# Patient Record
Sex: FEMALE | Race: BLACK OR AFRICAN AMERICAN | Employment: STUDENT | URBAN - METROPOLITAN AREA
[De-identification: names, ages, dates, MRNs, and addresses within clinical notes are randomized per-mention and may not be internally consistent; named-entity substitution may affect disease eponyms.]

---
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
## 2020-04-02 ENCOUNTER — TELEPHONE (OUTPATIENT)
Dept: FAMILY MEDICINE CLINIC | Age: 20
End: 2020-04-02

## 2020-04-02 NOTE — TELEPHONE ENCOUNTER
----- Message from Jb Fink sent at 4/2/2020  7:56 AM EDT -----  Regarding: Do.Lobb/Telephone  Level 1/Escalated Issue      Caller's first and last name and relationship (if not the patient): Rocio Rice , Mom       Best contact number(s): [phone]      What are the symptoms: Mental state ( stress and depression) scared her mother is going to past away due to her sickness       Transfer successful - yes/no (include outcome): Attempt to transfer/ no answer       Transfer declined - yes/no (include reason):n/a      Was caller advised to seek appropriate level of care - yes/no:Yes       Details to clarify the request:Margy requesting a refill for \"Zotec (unsure of mg)\" and anxiety Rx (unsure of name and mg)\" to be call into [address] [place] [address] on file. Margy stated the pt have allergies symptoms due to guidelines was not able to schedule an appointment.           Michelle oRdriguez

## 2020-04-02 NOTE — TELEPHONE ENCOUNTER
Called and spoke with mom. Advised that since it has been a year since she has been in office she would need to do a virtual visit. Mom placed patient on the phone. Spoke with patient and sent her a text link to get set up for [facility]. Patient will call back to schedule a virtual visit once she is set up in [facility].

## 2020-05-04 DIAGNOSIS — G47.00 INSOMNIA, UNSPECIFIED TYPE: ICD-10-CM

## 2020-05-05 RX ORDER — ESCITALOPRAM OXALATE 10 MG/1
TABLET ORAL
Qty: 30 TAB | Refills: 0 | Status: SHIPPED | OUTPATIENT
Start: 2020-05-05 | End: 2020-10-05 | Stop reason: SDUPTHER

## 2020-09-04 ENCOUNTER — TELEPHONE (OUTPATIENT)
Dept: FAMILY MEDICINE CLINIC | Age: 20
End: 2020-09-04

## 2020-10-05 ENCOUNTER — VIRTUAL VISIT (OUTPATIENT)
Dept: FAMILY MEDICINE CLINIC | Age: 20
End: 2020-10-05
Payer: COMMERCIAL

## 2020-10-05 DIAGNOSIS — G47.00 INSOMNIA, UNSPECIFIED TYPE: ICD-10-CM

## 2020-10-05 DIAGNOSIS — F32.A ANXIETY AND DEPRESSION: Primary | ICD-10-CM

## 2020-10-05 DIAGNOSIS — F41.9 ANXIETY AND DEPRESSION: Primary | ICD-10-CM

## 2020-10-05 PROCEDURE — 99213 OFFICE O/P EST LOW 20 MIN: CPT | Performed by: NURSE PRACTITIONER

## 2020-10-05 RX ORDER — ESCITALOPRAM OXALATE 10 MG/1
TABLET ORAL
Qty: 30 TAB | Refills: 5 | Status: SHIPPED | OUTPATIENT
Start: 2020-10-05 | End: 2021-10-05

## 2020-10-05 NOTE — PROGRESS NOTES
Consent: Ammon Howell, who was seen by synchronous (real-time) audio-video technology, and/or her healthcare decision maker, is aware that this patient-initiated, Telehealth encounter on 10/5/2020 is a billable service, with coverage as determined by her insurance carrier. She is aware that she may receive a bill and has provided verbal consent to proceed: Yes. 712      Subjective:   Ammon Howell is a 21 y.o. female who was seen for Medication Refill  States she used to have a medication for depression/anxiety that she wants to get back on - Lexapro 10mg per chart review   Has been off it since January d/t lack of insurance  Pt states she is now on [place] and would like to resume Lexapro  Reports that she struggles with \"tension in my body\" when off the medication and it was helpful in achieving restful sleep, was taking in the evening time. When on medication pt states it made her feel \"more peaceful. \" Denies negative SE/ADRs. Would like flu shot, advised will have to go to [facility]/t insurance. Prior to Admission medications    Medication Sig Start Date End Date Taking? Authorizing Provider   escitalopram oxalate (LEXAPRO) 10 mg tablet TAKE 1 TABLET BY MOUTH DAILY 10/5/20  Yes Tom NARANJO NP   ibuprofen (MOTRIN IB) 200 mg tablet Take  by mouth. Yes Provider, Historical   escitalopram oxalate (LEXAPRO) 10 mg tablet TAKE 1 TABLET BY MOUTH DAILY 5/5/20 10/5/20  Mirtha De Leon,    norgestimate-ethinyl estradiol (ORTHO TRI-CYCLEN, TRI-SPRINTEC) 0.18/0.215/0.25 mg-35 mcg (28) tab Take 1 Tab by mouth daily. May skip sugar pills 4/5/19   Bartolome Mariscal NP   loratadine (CLARITIN REDITABS) 10 mg dissolvable tablet Take 1 Tab by mouth daily.  11/1/18   Bartolome Mariscal NP     No Known Allergies    Patient Active Problem List    Diagnosis Date Noted    Dysmenorrhea in adolescent 07/13/2017    Emotional disturbance of adolescence 03/13/2014    Eczema 10/18/2011       ROS - negative except as listed above in the HPI      Objective:   Vital Signs: (As obtained by patient/caregiver at home)  There were no vitals taken for this visit. Physical Exam:   General: alert, cooperative, no distress   Mental  status: normal mood, behavior, speech, dress, motor activity, and thought processes, able to follow commands   HEENT: normocephalic, atraumatic    Eyes:  extraocular movements intact, sclera normal, no visible discharge   Ears:  external ears normal   Mouth/Throat:  mucous memrates appear moist    Neck: no visualized mass   Resp: respiratory effort is normal, breathing appears non-labored, no visualized signs of respiratory distress   Neuro: no gross deficits   Skin: no discoloration or lesions of concern on visible areas   Psychiatric: normal affect, consistent with stated mood, no evidence of hallucinations      Additional exam findings:      Assessment & Plan:   Diagnoses and all orders for this visit:    1. Anxiety and depression  2. Insomnia, unspecified type  -     escitalopram oxalate (LEXAPRO) 10 mg tablet; TAKE 1 TABLET BY MOUTH DAILY  - Restart med, tolerated well in the past.          Follow-up and Dispositions    · Return if symptoms worsen or fail to improve. I spent at least 15 minutes with this established patient, and >50% of the time was spent counseling and/or coordinating care regarding anxiety/depression      We discussed the expected course, resolution and complications of the diagnosis(es) in detail. Medication risks, benefits, costs, interactions, and alternatives were discussed as indicated. I advised her to contact the office if her condition worsens, changes or fails to improve as anticipated. She expressed understanding with the diagnosis(es) and plan. Deborah Chilel is a 21 y.o. female being evaluated by a video visit encounter for concerns as above. A caregiver was present when appropriate.  Due to this being a TeleHealth encounter (During IEQ-31 public health emergency), evaluation of the following organ systems was limited: Vitals/Constitutional/EENT/Resp/CV/GI//MS/Neuro/Skin/Heme-Lymph-Imm. Pursuant to the emergency declaration under the [address] [place] [address], [facility] waiver authority and the [employer] and [employer] Act, this Virtual  Visit was conducted, with patient's (and/or legal guardian's) consent, to reduce the patient's risk of exposure to COVID-19 and provide necessary medical care. Services were provided through a video synchronous discussion virtually to substitute for in-person clinic visit. Patient and provider were located at their individual homes.         Ulises Skinner NP

## 2020-10-05 NOTE — PROGRESS NOTES
Patricia Hyman is a 21 y.o. female , id x 2(name and ). Reviewed record, history, and  medications. Chief Complaint   Patient presents with    Medication Refill       3 most recent PHQ Screens 10/5/2020   Little interest or pleasure in doing things Not at all   Feeling down, depressed, irritable, or hopeless Not at all   Total Score PHQ 2 0   In the past year have you felt depressed or sad most days, even if you felt okay? -   Has there been a time in the past month when you have had serious thoughts about ending your life?  -   Have you ever in your whole life, tried to kill yourself or made a suicide attempt? -

## 2021-02-15 ENCOUNTER — OFFICE VISIT (OUTPATIENT)
Dept: OBGYN CLINIC | Age: 21
End: 2021-02-15
Payer: MEDICAID

## 2021-02-15 VITALS
HEIGHT: 69 IN | WEIGHT: 195.6 LBS | BODY MASS INDEX: 28.97 KG/M2 | SYSTOLIC BLOOD PRESSURE: 116 MMHG | DIASTOLIC BLOOD PRESSURE: 64 MMHG

## 2021-02-15 DIAGNOSIS — Z34.00 ENCOUNTER FOR SUPERVISION OF NORMAL INTRAUTERINE PREGNANCY IN PRIMIGRAVIDA, ANTEPARTUM: Primary | ICD-10-CM

## 2021-02-15 DIAGNOSIS — Z00.00 WELL WOMAN EXAM (NO GYNECOLOGICAL EXAM): ICD-10-CM

## 2021-02-15 PROCEDURE — 99395 PREV VISIT EST AGE 18-39: CPT | Performed by: OBSTETRICS & GYNECOLOGY

## 2021-02-15 RX ORDER — DIPHENHYDRAMINE HCL 25 MG
25 CAPSULE ORAL
COMMUNITY

## 2021-02-15 NOTE — PROGRESS NOTES
Esther Stevens is a No obstetric history on file. ,  21 y.o. female [address] whose Patient's last menstrual period was 02/08/2021 (within days). was on 2/8/2021 who presents for her annual checkup. She is having Pt reports her [place] [address] balance is \"off\" after her period. She would like to know some ways to regulate it. She would also like to know how to do a self breast exam. Pt reports having painful bumps in her vaginal area, she first noticed it a few years ago and she has experienced it twice now. Pt declines a refill for OCP. She gets an odor after her menses and is concerned about vaginal ph. She has no vaginal bumps now. Menses are now regular and not too heavy. With regard to the Gardisil vaccine, she has received all 3 injections. Menstrual status:    Her periods are moderate in flow. She is using three to five pads or tampons per day, usually regular and last 26-30 days. She denies dysmenorrhea. Mild abdominal cramps. She reports no premenstrual symptoms. Contraception:    The current method of family planning is none and She declines contraception and counseling. Not sexually active. Sexual history:    She  reports never being sexually active. Medical conditions:    She has never had an annual GYN exam, she has not the following changes in her health history: none. Pap and Mammogram History:    She has never had a Pap smear or HPV test.     The patient has never had a mammogram.    The patient does have a family history of breast cancer. States that her mother had breast cancer in her 19's. 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       escitalopram oxalate (LEXAPRO) 10 mg tablet TAKE 1 TABLET BY MOUTH DAILY 30 Tab 5    ibuprofen (MOTRIN IB) 200 mg tablet Take  by mouth.  norgestimate-ethinyl estradiol (ORTHO TRI-CYCLEN, TRI-SPRINTEC) 0.18/0.215/0.25 mg-35 mcg (28) tab Take 1 Tab by mouth daily. May skip sugar pills 1 Package 11    loratadine (CLARITIN REDITABS) 10 mg dissolvable tablet Take 1 Tab by mouth daily. 30 Tab 5     Allergies: Patient has no known allergies. Social History     Socioeconomic History    Marital status: SINGLE     Spouse name: Not on file    Number of children: Not on file    Years of education: Not on file    Highest education level: Not on file   Occupational History    Not on file   Social Needs    Financial resource strain: Not on file    Food insecurity     Worry: Not on file     Inability: Not on file    Transportation needs     Medical: Not on file     Non-medical: Not on file   Tobacco Use    Smoking status: Never Smoker    Smokeless tobacco: Never Used   Substance and Sexual Activity    Alcohol use: No    Drug use: No    Sexual activity: Never   Lifestyle    Physical activity     Days per week: Not on file     Minutes per session: Not on file    Stress: Not on file   Relationships    Social connections     Talks on phone: Not on file     Gets together: Not on file     Attends Christian service: Not on file     Active member of club or organization: Not on file     Attends meetings of clubs or organizations: Not on file     Relationship status: Not on file    Intimate partner violence     Fear of current or ex partner: Not on file     Emotionally abused: Not on file     Physically abused: Not on file     Forced sexual activity: Not on file   Other Topics Concern    Not on file   Social History Narrative    Not on file     Tobacco History:  reports that she has never smoked. She has never used smokeless tobacco.  Alcohol Abuse:  reports no history of alcohol use.   Drug Abuse:  reports no history of drug use. Patient Active Problem List   Diagnosis Code    Eczema L30.9    Emotional disturbance of adolescence F93.9    Dysmenorrhea in adolescent N94.6       Review of Systems - History obtained from the patient  Constitutional: negative for weight loss, fever, night sweats  HEENT: negative for hearing loss, earache, congestion, snoring, sorethroat  CV: negative for chest pain, palpitations, edema  Resp: negative for cough, shortness of breath, wheezing  GI: negative for change in bowel habits, abdominal pain, black or bloody stools  : negative for frequency, dysuria, hematuria, vaginal discharge  MSK: negative for back pain, joint pain, muscle pain  Breast: negative for breast lumps, nipple discharge, galactorrhea  Skin :negative for itching, rash, hives  Neuro: negative for dizziness, headache, confusion, weakness  Psych: negative for anxiety, depression, change in mood  Heme/lymph: negative for bleeding, bruising, pallor    Physical Exam    Visit Vitals  /64   Ht 5' 9\" (1.753 m)   Wt 195 lb 9.6 oz (88.7 kg)   LMP 02/08/2021 (Within Days)   BMI 28.89 kg/m²       Constitutional  · Appearance: well-nourished, well developed, alert, in no acute distress      Neurologic/Psychiatric  · Mental Status:  · Orientation: grossly oriented to person, place and time  · Mood and Affect: mood normal, affect appropriate    . Assessment:  Routine gynecologic examination  Her current medical status is satisfactory  Family h/o breast cancer  Likely episodes of BV by history. Plan:  Counseled re: diet, exercise, healthy lifestyle  Return for yearly wellness visits  Instructed in SBE and given handouts  Advised her to see her mother's breast cancer MD to discuss any increased breast cancer surveillance. Advised to RTO when she has the vaginal bumps so that they can be evaluated. She will try probiotics and yogurt for her vaginal ph symptoms. Call if she wants to try Flagy.

## 2021-02-15 NOTE — PATIENT INSTRUCTIONS

## 2021-05-27 ENCOUNTER — OFFICE VISIT (OUTPATIENT)
Dept: FAMILY MEDICINE CLINIC | Age: 21
End: 2021-05-27

## 2021-05-27 VITALS
HEART RATE: 69 BPM | BODY MASS INDEX: 30.07 KG/M2 | SYSTOLIC BLOOD PRESSURE: 96 MMHG | TEMPERATURE: 99.1 F | WEIGHT: 203 LBS | RESPIRATION RATE: 18 BRPM | DIASTOLIC BLOOD PRESSURE: 66 MMHG | HEIGHT: 69 IN | OXYGEN SATURATION: 99 %

## 2021-05-27 DIAGNOSIS — R73.01 IMPAIRED FASTING GLUCOSE: ICD-10-CM

## 2021-05-27 DIAGNOSIS — E78.2 MIXED HYPERLIPIDEMIA: Primary | ICD-10-CM

## 2021-05-27 DIAGNOSIS — L73.1 INGROWN HAIR: ICD-10-CM

## 2021-05-27 PROCEDURE — 99214 OFFICE O/P EST MOD 30 MIN: CPT | Performed by: NURSE PRACTITIONER

## 2021-05-27 NOTE — PROGRESS NOTES
Chief Complaint   Patient presents with   Nia Lloyd     Patient in office today for cpe and labs-pt last meal was at 1am.    Pt have c/o of bump noted on pubis. Pt denies drainage. Pt denies shaving or hair removal products. Pt denies increase in size  Have not treated with otc. 1. Have you been to the ER, urgent care clinic since your last visit? Hospitalized since your last visit? No    2. Have you seen or consulted any other health care providers outside of the [address] [place] [address] since your last visit? Include any pap smears or colon screening.  No

## 2021-05-27 NOTE — PROGRESS NOTES
Chief Complaint   Patient presents with   Nia Lloyd     Patient in office today for cpe and labs-pt last meal was at 1am.  Pt has medicaid, physicals not covered. Pt have c/o of bump noted on pubis. First noticed a few days. Pt denies drainage. Pt denies shaving or hair removal products. Pt denies increase in size  Have not treated with otc. Denies any hisotry of MRSA. Health Maintenance Due   Topic Date Due    Hepatitis C Screening  Never done    DTaP/Tdap/Td series (2 - Td) 09/23/2010    COVID-19 Vaccine (1) Never done     Denies any other concerns at this time. Chief Complaint   Patient presents with   Nia Lloyd     she is a 21y.o. year old female who presents for evalution. Reviewed PmHx, RxHx, FmHx, SocHx, AllgHx and updated and dated in the chart.     Review of Systems - negative except as listed above in the HPI    Objective:     Vitals:    05/27/21 0758   BP: 96/66   Pulse: 69   Resp: 18   Temp: 99.1 °F (37.3 °C)   TempSrc: Oral   SpO2: 99%   Weight: 203 lb (92.1 kg)   Height: 5' 9\" (1.753 m)     Physical Examination: General appearance - alert, well appearing, and in no distress  Mental status - normal mood, behavior, speech, dress, motor activity, and thought processes  Eyes - pupils equal and reactive, extraocular eye movements intact  Ears - bilateral TM's and external ear canals normal  Nose - normal and patent, no erythema, discharge or polyps and normal nontender sinuses  Mouth - mucous membranes moist, pharynx normal without lesions  Neck - supple, no significant adenopathy, carotids upstroke normal bilaterally, no bruits, thyroid exam: thyroid is normal in size without nodules or tenderness  Chest - clear to auscultation, no wheezes, rales or rhonchi, symmetric air entry  Heart - normal rate, regular rhythm, normal S1, S2, no murmurs  Extremities - peripheral pulses normal, no pedal edema, no clubbing or cyanosis  Skin - papule present mons pubis that appears consistent with ingrown hair without any evidence of infection    Assessment/ Plan:   Diagnoses and all orders for this visit:    1. Mixed hyperlipidemia  -     LIPID PANEL; Future  -     METABOLIC PANEL, COMPREHENSIVE; Future  -     CBC WITH AUTOMATED DIFF; Future  Will notify results and deviate plan based on findings. The patient is asked to modify diet and lifestyle to facilitate weight loss and therefore avoid health risks that are associated with obesity. 2. Impaired fasting glucose  -     TSH 3RD GENERATION; Future  -     HEMOGLOBIN A1C WITH EAG; Future  Will notify results and deviate plan based on findings. 3. Ingrown hair  Apply abx ointment PRN to affected area and keep clean with soap and water. Monitor for s/sx of infection, reviewed those sx with patient. I have discussed the diagnosis with the patient and the intended plan as seen in the above orders. The patient has received an after-visit summary and questions were answered concerning future plans. Medication Side Effects and Warnings were discussed with patient: yes  Patient Labs were reviewed and or requested: yes  Patient Past Records were reviewed and or requested  yes  Patient / Caregiver Understanding of treatment plan was verbalized during office visit YES    MARCELO Bee    There are no Patient Instructions on file for this visit.

## 2021-05-28 PROBLEM — R73.03 PREDIABETES: Status: ACTIVE | Noted: 2021-05-28

## 2021-05-28 LAB
ALBUMIN SERPL-MCNC: 4.1 G/DL (ref 3.9–5)
ALBUMIN/GLOB SERPL: 1.4 {RATIO} (ref 1.2–2.2)
ALP SERPL-CCNC: 87 IU/L (ref 45–106)
ALT SERPL-CCNC: 7 IU/L (ref 0–32)
AST SERPL-CCNC: 11 IU/L (ref 0–40)
BASOPHILS # BLD AUTO: 0 X10E3/UL (ref 0–0.2)
BASOPHILS NFR BLD AUTO: 0 %
BILIRUB SERPL-MCNC: <0.2 MG/DL (ref 0–1.2)
BUN SERPL-MCNC: 7 MG/DL (ref 6–20)
BUN/CREAT SERPL: 10 (ref 9–23)
CALCIUM SERPL-MCNC: 9.2 MG/DL (ref 8.7–10.2)
CHLORIDE SERPL-SCNC: 105 MMOL/L (ref 96–106)
CHOLEST SERPL-MCNC: 153 MG/DL (ref 100–199)
CO2 SERPL-SCNC: 24 MMOL/L (ref 20–29)
CREAT SERPL-MCNC: 0.67 MG/DL (ref 0.57–1)
EOSINOPHIL # BLD AUTO: 0.2 X10E3/UL (ref 0–0.4)
EOSINOPHIL NFR BLD AUTO: 2 %
ERYTHROCYTE [DISTWIDTH] IN BLOOD BY AUTOMATED COUNT: 13.1 % (ref 11.7–15.4)
EST. AVERAGE GLUCOSE BLD GHB EST-MCNC: 123 MG/DL
GLOBULIN SER CALC-MCNC: 3 G/DL (ref 1.5–4.5)
GLUCOSE SERPL-MCNC: 84 MG/DL (ref 65–99)
HBA1C MFR BLD: 5.9 % (ref 4.8–5.6)
HCT VFR BLD AUTO: 34.5 % (ref 34–46.6)
HDLC SERPL-MCNC: 45 MG/DL
HGB BLD-MCNC: 11.6 G/DL (ref 11.1–15.9)
IMM GRANULOCYTES # BLD AUTO: 0 X10E3/UL (ref 0–0.1)
IMM GRANULOCYTES NFR BLD AUTO: 0 %
IMP & REVIEW OF LAB RESULTS: NORMAL
LDLC SERPL CALC-MCNC: 98 MG/DL (ref 0–99)
LYMPHOCYTES # BLD AUTO: 2.8 X10E3/UL (ref 0.7–3.1)
LYMPHOCYTES NFR BLD AUTO: 36 %
MCH RBC QN AUTO: 28.8 PG (ref 26.6–33)
MCHC RBC AUTO-ENTMCNC: 33.6 G/DL (ref 31.5–35.7)
MCV RBC AUTO: 86 FL (ref 79–97)
MONOCYTES # BLD AUTO: 0.7 X10E3/UL (ref 0.1–0.9)
MONOCYTES NFR BLD AUTO: 9 %
NEUTROPHILS # BLD AUTO: 4 X10E3/UL (ref 1.4–7)
NEUTROPHILS NFR BLD AUTO: 53 %
PLATELET # BLD AUTO: 326 X10E3/UL (ref 150–450)
POTASSIUM SERPL-SCNC: 4.4 MMOL/L (ref 3.5–5.2)
PROT SERPL-MCNC: 7.1 G/DL (ref 6–8.5)
RBC # BLD AUTO: 4.03 X10E6/UL (ref 3.77–5.28)
SODIUM SERPL-SCNC: 139 MMOL/L (ref 134–144)
TRIGL SERPL-MCNC: 46 MG/DL (ref 0–149)
TSH SERPL DL<=0.005 MIU/L-ACNC: 2.52 UIU/ML (ref 0.45–4.5)
VLDLC SERPL CALC-MCNC: 10 MG/DL (ref 5–40)
WBC # BLD AUTO: 7.6 X10E3/UL (ref 3.4–10.8)

## 2021-05-28 NOTE — PROGRESS NOTES
The following message was sent to pt via [employer] portal in reference to lab results:  Good morning Fleet Ards,     Attached are the results of your most recent lab work. I have the following recommendations:    1. Your CBC which looks at your white blood cells, red blood cells, and hemoglobin came back looking normal. No sign of infection or anemia. 2. Your metabolic panel which looks at your blood glucose, liver function, and kidney function looks perfect. 3. Your cholesterol came back looking great so keep up the good work with diet and exercise. 4. Your TSH which screens for thyroid disease came back normal. This means you do not have hyper or hypothyroidism. 5. We also checked your hemoglobin a1c during your last visit. This measures your average blood glucose over the last three months. Your results suggest that you are \"prediabetic. \" This means that you have an increased risk of developing type 2 diabetes. I urge you to make some diet and lifestyle changes to prevent this from worsening, otherwise I am going to recommend you start a new daily medication. You should try to follow a low carb diet. Try to watch your portion sizes and limit your intake of sugar and carbohydrates. We want to prevent this from developing into diabetes because having diabetes increases your risk for kidney disease, stroke, heart attack, and vision loss. I recommend you follow up in 3-6 months to recheck this to make sure it has not worsened. Please let me know if you have any questions or concerns regarding these results.    MARCELO Arvizu

## 2021-10-05 ENCOUNTER — OFFICE VISIT (OUTPATIENT)
Dept: FAMILY MEDICINE CLINIC | Age: 21
End: 2021-10-05
Payer: MEDICAID

## 2021-10-05 VITALS
HEART RATE: 60 BPM | BODY MASS INDEX: 29.47 KG/M2 | WEIGHT: 199 LBS | OXYGEN SATURATION: 99 % | SYSTOLIC BLOOD PRESSURE: 103 MMHG | HEIGHT: 69 IN | TEMPERATURE: 98.4 F | DIASTOLIC BLOOD PRESSURE: 70 MMHG

## 2021-10-05 DIAGNOSIS — N92.6 IRREGULAR MENSES: Primary | ICD-10-CM

## 2021-10-05 DIAGNOSIS — R73.03 PREDIABETES: ICD-10-CM

## 2021-10-05 DIAGNOSIS — Z23 NEEDS FLU SHOT: ICD-10-CM

## 2021-10-05 PROCEDURE — 90686 IIV4 VACC NO PRSV 0.5 ML IM: CPT | Performed by: STUDENT IN AN ORGANIZED HEALTH CARE EDUCATION/TRAINING PROGRAM

## 2021-10-05 PROCEDURE — 99214 OFFICE O/P EST MOD 30 MIN: CPT | Performed by: STUDENT IN AN ORGANIZED HEALTH CARE EDUCATION/TRAINING PROGRAM

## 2021-10-05 PROCEDURE — 90471 IMMUNIZATION ADMIN: CPT | Performed by: STUDENT IN AN ORGANIZED HEALTH CARE EDUCATION/TRAINING PROGRAM

## 2021-10-05 NOTE — PROGRESS NOTES
Progress Note    she is a 24y.o. year old female who presents for evalution. Chief Complaint   Patient presents with    Complete Physical    Labs     is fasting          Assessment/ Plan:   Diagnoses and all orders for this visit:    1. Irregular menses  -     CBC WITH AUTOMATED DIFF; Future  -     TESTOSTERONE, TOTAL, FEMALE/CHILD; Future  -     TSH 3RD GENERATION; Future    2. Prediabetes  Assessment & Plan:   unclear control, continue current plan pending work up below, lifestyle modifications recommended    Orders:  -     METABOLIC PANEL, COMPREHENSIVE; Future  -     LIPID PANEL; Future  -     HEMOGLOBIN A1C WITH EAG; Future  -     CBC WITH AUTOMATED DIFF; Future  -     TESTOSTERONE, TOTAL, FEMALE/CHILD; Future  -     TSH 3RD GENERATION; Future    3. Needs flu shot  -     INFLUENZA VIRUS VAC QUAD,SPLIT,PRESV FREE SYRINGE IM    Other orders  -     CVD REPORT     Follow-up and Dispositions    · Return in about 3 months (around 1/5/2022) for follow-up prediabetes 3-6 months. I have discussed the diagnosis with the patient and the intended plan as seen in the above orders. The patient has received an after-visit summary and questions were answered concerning future plans. Pt conveyed understanding of plan. Medication Side Effects and Warnings were discussed with patient        Subjective:     Chief Complaint   Patient presents with    Complete Physical    Labs     is fasting      Diet: fast food junkie; occasional cooking her own food (once/month)   No changes to diet since 5/2021 when pre-diabetes    Drinks a lot of water, body armour (1/day). No caffeine. Alcohol 1 mixed drink of hard liquor every 2-3 days. Also wine at social events. Exercise: On her feet a lot. Works as a  and starting to do [employer] with her mom. No pets currently  Mood has been a bit of a struggle  Uses music to help.     LMP started 9/18  Some issues with facial hair, darkening of skin in axilla. Reviewed PmHx, RxHx, FmHx, SocHx, AllgHx and updated and dated in the chart. Review of Systems - negative except as listed above in the HPI    Objective:     Vitals:    10/05/21 1025   BP: 103/70   Pulse: 60   Temp: 98.4 °F (36.9 °C)   SpO2: 99%   Weight: 199 lb (90.3 kg)   Height: 5' 9\" (1.753 m)       Current Outpatient Medications   Medication Sig    diphenhydrAMINE (BenadryL) 25 mg capsule Take 25 mg by mouth every six (6) hours as needed. No current facility-administered medications for this visit. Physical Exam  Vitals and nursing note reviewed. Constitutional:       General: She is not in acute distress. Appearance: Normal appearance. She is normal weight. She is not ill-appearing, toxic-appearing or diaphoretic. HENT:      Head: Normocephalic and atraumatic. Right Ear: Tympanic membrane, ear canal and external ear normal.      Left Ear: Tympanic membrane, ear canal and external ear normal.      Nose: Nose normal.      Mouth/Throat:      Mouth: Mucous membranes are moist.      Pharynx: Oropharynx is clear. Eyes:      General: No scleral icterus. Right eye: No discharge. Left eye: No discharge. Extraocular Movements: Extraocular movements intact. Conjunctiva/sclera: Conjunctivae normal.      Pupils: Pupils are equal, round, and reactive to light. Cardiovascular:      Rate and Rhythm: Normal rate and regular rhythm. Pulses: Normal pulses. Heart sounds: Normal heart sounds. Pulmonary:      Effort: Pulmonary effort is normal. No respiratory distress. Breath sounds: Normal breath sounds. Abdominal:      General: Abdomen is flat. Bowel sounds are normal. There is no distension. Palpations: Abdomen is soft. There is no mass. Tenderness: There is no abdominal tenderness. There is no guarding or rebound. Musculoskeletal:         General: No tenderness or deformity. Cervical back: No rigidity.       Right lower leg: No edema. Left lower leg: No edema. Lymphadenopathy:      Cervical: No cervical adenopathy. Skin:     General: Skin is warm and dry. Neurological:      General: No focal deficit present. Mental Status: She is alert and oriented to person, place, and time. Sensory: No sensory deficit. Motor: No weakness. Gait: Gait normal.      Deep Tendon Reflexes: Reflexes normal.   Psychiatric:         Mood and Affect: Mood normal.         Behavior: Behavior normal.         Thought Content:  Thought content normal.         Judgment: Judgment normal.              Harrison Hrenandez MD

## 2021-10-05 NOTE — LETTER
10/18/2021    Ms. [address] [id number]      Dear Burton Conroy:    Please find your most recent results below. Resulted Orders   METABOLIC PANEL, COMPREHENSIVE   Result Value Ref Range    Glucose 86 65 - 99 mg/dL    BUN 3 (L) 6 - 20 mg/dL    Creatinine 0.65 0.57 - 1.00 mg/dL    GFR est non- >59 mL/min/1.73    GFR est  >59 mL/min/1.73    BUN/Creatinine ratio 5 (L) 9 - 23    Sodium 140 134 - 144 mmol/L    Potassium 4.6 3.5 - 5.2 mmol/L    Chloride 104 96 - 106 mmol/L    CO2 23 20 - 29 mmol/L    Calcium 9.6 8.7 - 10.2 mg/dL    Protein, total 7.2 6.0 - 8.5 g/dL    Albumin 4.3 3.9 - 5.0 g/dL    GLOBULIN, TOTAL 2.9 1.5 - 4.5 g/dL    A-G Ratio 1.5 1.2 - 2.2    Bilirubin, total <0.2 0.0 - 1.2 mg/dL    Alk. phosphatase 81 44 - 121 IU/L    AST (SGOT) 12 0 - 40 IU/L    ALT (SGPT) 9 0 - 32 IU/L    Narrative    Performed at:  [address]  [id number]  : Juan Luis Haji MD, Phone:  [phone]   HEMOGLOBIN A1C WITH EAG   Result Value Ref Range    Hemoglobin A1c 5.7 (H) 4.8 - 5.6 %    Estimated average glucose 117 mg/dL    Narrative    Performed at:  [address]  [id number]  : Juan Luis Haji MD, Phone:  [phone]       RECOMMENDATIONS:  Attached are the results of your most recent lab work. I have the following recommendations:     1. Your CBC which looks at your white blood cells, red blood cells, and hemoglobin came back looking normal. No sign of infection or anemia.      2. Your metabolic panel which looks at your blood glucose, electrolytes, liver function, and kidney function looks perfect. A1c continues to be in the prediabetic range, but has improved since 4 months ago. [address] job!     3. Your cholesterol is good. Continue with your efforts to follow a heart healthy diet and exercise routinely.  As you know the BEST way to lower cholesterol is to follow a strict diet that is low fat combined with regular exercise. Here are a few tips on how to do this:  - Avoid foods that are high in saturated and trans fats (especially fried foods)   - Replace butter with olive oil, avocado oil, other oils that are primarily high in unsaturated fats  - Eat lots of fresh fruits and vegetables  - Choose fish, chicken, and turkey as your serving of meat  - Avoid too many processed foods  - Use whole wheat bread, pasta, rice  You should also try and do 30 minutes of aerobic exercise most days of the week. All of these will contribute to lowering your cholesterol and decrease your risk of heart disease and stroke.      4. Your TSH which screens for thyroid disease came back normal. This means you likely do not have hyper or hypothyroidism.      5. Normal testosterone level.  This was checked later in the day than normally recommended, so we could repeat the test in the future if you'd like.      Some values may be minimally outside the \"normal\" range but are not harmful or clinically significant. Please let me know if you have any questions or concerns regarding these results.  I recommend we repeat fasting labs in 6 months    Please call me if you have any questions: [phone]    Sincerely,      Markel Lui MD

## 2021-10-05 NOTE — PROGRESS NOTES
Jim Spear is a 24 y.o. female , id x 2(name and ). Reviewed record, history, and  medications. Chief Complaint   Patient presents with    Complete Physical    Labs     is fasting        Vitals:    10/05/21 1025   BP: 103/70   Pulse: 60   Temp: 98.4 °F (36.9 °C)   SpO2: 99%   Weight: 199 lb (90.3 kg)   Height: 5' 9\" (1.753 m)       Coordination of Care Questionnaire:   1) Have you been to an emergency room, urgent care, or hospitalized since your last visit?   no       2. Have seen or consulted any other health care provider since your last visit? NO      3 most recent PHQ Screens 10/5/2021   Little interest or pleasure in doing things Not at all   Feeling down, depressed, irritable, or hopeless Not at all   Total Score PHQ 2 0   In the past year have you felt depressed or sad most days, even if you felt okay? -   Has there been a time in the past month when you have had serious thoughts about ending your life? -   Have you ever in your whole life, tried to kill yourself or made a suicide attempt? -       Patient is accompanied by self I have received verbal consent from Jim Spear to discuss any/all medical information while they are present in the room. After obtaining Migdalia Yuan's consent, and per orders of Dr. Durga Reyna, the vaccines ordered were given by Maria Elena Cornejo LPN. Patient instructed to remain in clinic for 20 minutes afterwards, and to report any adverse reaction to me immediately. Patient did not display any adverse side effects. Pt / caregiver given opportunity to review vaccine information sheet prior to vaccine administration. Opportunity given for questions and concerns. No questions or concerns at this time.

## 2021-10-05 NOTE — PATIENT INSTRUCTIONS
Lifestyle areas to focus on. .. Exercise  Nutrition  Sleep  Mindfulness - deep/diaphragmatic breathing, progressive muscle relaxation, guided imagery, yoga, maria guadalupe chi, meditation, prayer, Jain reading, journaling, coloring/art (practice 5 minutes, 3 times each day)  Social connectedness    Check out on amazon: WILD 5 wellness workbook    Exercise recommendations  150 minutes of moderate intensity cardio exercise in the week  3 days of total body strength training  Work on stretching/flexibility as well. It's great to get outside! But you can also check out youtube for fun videos and workouts. I like yoga with katy       Prediabetes: Care Instructions  Overview   Prediabetes is a warning sign that you're at risk for getting type 2 diabetes. It means that your blood sugar is higher than it should be. But it's not high enough to be diabetes. The food you eat naturally turns into sugar. Your body uses the sugar for energy. Normally, an organ called the pancreas makes insulin. And insulin allows the sugar in your blood to get into your body's cells. But sometimes the body can't use insulin the right way. So the sugar stays in your blood instead. This is called insulin resistance. The buildup of sugar in your blood means you have prediabetes. The good news is that you may be able to prevent or delay diabetes. Making small lifestyle changes, like getting active and changing your eating habits, may help you get your blood sugar back to normal. You can work with your doctor to make a treatment plan. Follow-up care is a key part of your treatment and safety. Be sure to make and go to all appointments, and call your doctor if you are having problems. It's also a good idea to know your test results and keep a list of the medicines you take. How can you care for yourself at home? · Watch your weight. A healthy weight helps your body use insulin properly.   · Limit the amount of calories, sweets, and unhealthy fat you eat. Ask your doctor if you should see a dietitian. A registered dietitian can help you create meal plans that fit your lifestyle. · Get at least 30 minutes of exercise on most days of the week. Exercise helps control your blood sugar. It also helps you maintain a healthy weight. Walking is a good choice. You also may want to do other activities, such as running, swimming, cycling, or playing tennis or team sports. · Do not smoke. Smoking can make prediabetes worse. If you need help quitting, talk to your doctor about stop-smoking programs and medicines. These can increase your chances of quitting for good. · If your doctor prescribed medicines, take them exactly as prescribed. Call your doctor if you think you are having a problem with your medicine. You will get more details on the specific medicines your doctor prescribes. When should you call for help? Watch closely for changes in your health, and be sure to contact your doctor if:    · You have any symptoms of diabetes. These may include:  ? Being thirsty more often. ? Urinating more. ? Being hungrier. ? Losing weight. ? Being very tired. ? Having blurry vision.     · You have a wound that will not heal.     · You have an infection that will not go away.     · You have problems with your blood pressure.     · You want more information about diabetes and how you can keep from getting it. Where can you learn more? Go to http://www.gray.com/  Enter I222 in the search box to learn more about \"Prediabetes: Care Instructions. \"  Current as of: August 31, 2020               Content Version: 13.0  © 2350-4149 Healthwise, Incorporated. Care instructions adapted under license by [employer] (which disclaims liability or warranty for this information).  If you have questions about a medical condition or this instruction, always ask your healthcare professional. [facility] disclaims any warranty or liability for your use of this information. Vaccine Information Statement    Influenza (Flu) Vaccine (Inactivated or Recombinant): What You Need to Know    Many vaccine information statements are available in Arabic and other languages. See www.immunize.org/vis. Hojas de información sobre vacunas están disponibles en español y en muchos otros idiomas. Visite www.immunize.org/vis. 1. Why get vaccinated? Influenza vaccine can prevent influenza (flu). Flu is a contagious disease that spreads around the [place] every year, usually between October and May. Anyone can get the flu, but it is more dangerous for some people. Infants and young children, people 72 years and older, pregnant people, and people with certain health conditions or a weakened immune system are at greatest risk of flu complications. Pneumonia, bronchitis, sinus infections, and ear infections are examples of flu-related complications. If you have a medical condition, such as heart disease, cancer, or diabetes, flu can make it worse. Flu can cause fever and chills, sore throat, muscle aches, fatigue, cough, headache, and runny or stuffy nose. Some people may have vomiting and diarrhea, though this is more common in children than adults. In an average year, thousands of people in the [facility] die from flu, and many more are hospitalized. Flu vaccine prevents millions of illnesses and flu-related visits to the doctor each year. 2. Influenza vaccines     CDC recommends everyone 6 months and older get vaccinated every flu season. Children 6 months through 6years of age may need 2 doses during a single flu season. Everyone else needs only 1 dose each flu season. It takes about 2 weeks for protection to develop after vaccination. There are many flu viruses, and they are always changing.  Each year a new flu vaccine is made to protect against the influenza viruses believed to be likely to cause disease in the upcoming flu season. Even when the vaccine doesnt exactly match these viruses, it may still provide some protection. Influenza vaccine does not cause flu. Influenza vaccine may be given at the same time as other vaccines. 3. Talk with your health care provider    Tell your vaccination provider if the person getting the vaccine:   Has had an allergic reaction after a previous dose of influenza vaccine, or has any severe, life-threatening allergies    Has ever had Guillain-Barré Syndrome (also called GBS)    In some cases, your health care provider may decide to postpone influenza vaccination until a future visit. Influenza vaccine can be administered at any time during pregnancy. People who are or will be pregnant during influenza season should receive inactivated influenza vaccine. People with minor illnesses, such as a cold, may be vaccinated. People who are moderately or severely ill should usually wait until they recover before getting influenza vaccine. Your health care provider can give you more information. 4. Risks of a vaccine reaction     Soreness, redness, and swelling where the shot is given, fever, muscle aches, and headache can happen after influenza vaccination.  There may be a very small increased risk of Guillain-Barré Syndrome (GBS) after inactivated influenza vaccine (the flu shot). [facility] children who get the flu shot along with pneumococcal vaccine (PCV13) and/or DTaP vaccine at the same time might be slightly more likely to have a seizure caused by fever. Tell your health care provider if a child who is getting flu vaccine has ever had a seizure. People sometimes faint after medical procedures, including vaccination. Tell your provider if you feel dizzy or have vision changes or ringing in the ears. As with any medicine, there is a very remote chance of a vaccine causing a severe allergic reaction, other serious injury, or death.     5. What if there is a serious problem? An allergic reaction could occur after the vaccinated person leaves the clinic. If you see signs of a severe allergic reaction (hives, swelling of the face and throat, difficulty breathing, a fast heartbeat, dizziness, or weakness), call 9-1-1 and get the person to the nearest hospital.    For other signs that concern you, call your health care provider. Adverse reactions should be reported to the Vaccine Adverse Event Reporting System (VAERS). Your health care provider will usually file this report, or you can do it yourself. Visit the VAERS website at www.vaers. [facility].gov or call 2-460.982.3186. VAERS is only for reporting reactions, and VAERS staff members do not give medical advice. 6. The National Vaccine Injury Compensation Program    The [facility] Vaccine Injury Compensation Program (VICP) is a federal program that was created to compensate people who may have been injured by certain vaccines. Claims regarding alleged injury or death due to vaccination have a time limit for filing, which may be as short as two years. Visit the VICP website at www.[facility].gov/vaccinecompensation or call 6-569.192.5297 to learn about the program and about filing a claim. 7. How can I learn more?  Ask your health care provider.  Call your local or state health department.  Visit the website of the Food and Drug Administration (FDA) for vaccine package inserts and additional information at www.fda.gov/vaccines-blood-biologics/vaccines.  Contact the Centers for Disease Control and Prevention (CDC):  - Call 9-110.996.6806 (1-800-CDC-INFO) or  - Visit CDCs influenza website at www.cdc.gov/flu. Vaccine Information Statement   Inactivated Influenza Vaccine   8/6/2021  42 DAWNA Conway [id number]-[id number]   Department of Health and Human Services  Centers for Disease Control and Prevention    Office Use Only

## 2021-10-08 LAB
ALBUMIN SERPL-MCNC: 4.3 G/DL (ref 3.9–5)
ALBUMIN/GLOB SERPL: 1.5 {RATIO} (ref 1.2–2.2)
ALP SERPL-CCNC: 81 IU/L (ref 44–121)
ALT SERPL-CCNC: 9 IU/L (ref 0–32)
AST SERPL-CCNC: 12 IU/L (ref 0–40)
BASOPHILS # BLD AUTO: 0 X10E3/UL (ref 0–0.2)
BASOPHILS NFR BLD AUTO: 0 %
BILIRUB SERPL-MCNC: <0.2 MG/DL (ref 0–1.2)
BUN SERPL-MCNC: 3 MG/DL (ref 6–20)
BUN/CREAT SERPL: 5 (ref 9–23)
CALCIUM SERPL-MCNC: 9.6 MG/DL (ref 8.7–10.2)
CHLORIDE SERPL-SCNC: 104 MMOL/L (ref 96–106)
CHOLEST SERPL-MCNC: 150 MG/DL (ref 100–199)
CO2 SERPL-SCNC: 23 MMOL/L (ref 20–29)
CREAT SERPL-MCNC: 0.65 MG/DL (ref 0.57–1)
EOSINOPHIL # BLD AUTO: 0.1 X10E3/UL (ref 0–0.4)
EOSINOPHIL NFR BLD AUTO: 2 %
ERYTHROCYTE [DISTWIDTH] IN BLOOD BY AUTOMATED COUNT: 14.3 % (ref 11.7–15.4)
EST. AVERAGE GLUCOSE BLD GHB EST-MCNC: 117 MG/DL
GLOBULIN SER CALC-MCNC: 2.9 G/DL (ref 1.5–4.5)
GLUCOSE SERPL-MCNC: 86 MG/DL (ref 65–99)
HBA1C MFR BLD: 5.7 % (ref 4.8–5.6)
HCT VFR BLD AUTO: 36.8 % (ref 34–46.6)
HDLC SERPL-MCNC: 51 MG/DL
HGB BLD-MCNC: 11.7 G/DL (ref 11.1–15.9)
IMM GRANULOCYTES # BLD AUTO: 0 X10E3/UL (ref 0–0.1)
IMM GRANULOCYTES NFR BLD AUTO: 0 %
IMP & REVIEW OF LAB RESULTS: NORMAL
LDLC SERPL CALC-MCNC: 87 MG/DL (ref 0–99)
LYMPHOCYTES # BLD AUTO: 2.1 X10E3/UL (ref 0.7–3.1)
LYMPHOCYTES NFR BLD AUTO: 28 %
MCH RBC QN AUTO: 27 PG (ref 26.6–33)
MCHC RBC AUTO-ENTMCNC: 31.8 G/DL (ref 31.5–35.7)
MCV RBC AUTO: 85 FL (ref 79–97)
MONOCYTES # BLD AUTO: 0.6 X10E3/UL (ref 0.1–0.9)
MONOCYTES NFR BLD AUTO: 8 %
NEUTROPHILS # BLD AUTO: 4.5 X10E3/UL (ref 1.4–7)
NEUTROPHILS NFR BLD AUTO: 62 %
PLATELET # BLD AUTO: 362 X10E3/UL (ref 150–450)
POTASSIUM SERPL-SCNC: 4.6 MMOL/L (ref 3.5–5.2)
PROT SERPL-MCNC: 7.2 G/DL (ref 6–8.5)
RBC # BLD AUTO: 4.33 X10E6/UL (ref 3.77–5.28)
SODIUM SERPL-SCNC: 140 MMOL/L (ref 134–144)
TESTOST SERPL-MCNC: 32.2 NG/DL (ref 10–55)
TRIGL SERPL-MCNC: 55 MG/DL (ref 0–149)
TSH SERPL DL<=0.005 MIU/L-ACNC: 1.06 UIU/ML (ref 0.45–4.5)
VLDLC SERPL CALC-MCNC: 12 MG/DL (ref 5–40)
WBC # BLD AUTO: 7.4 X10E3/UL (ref 3.4–10.8)

## 2021-10-11 NOTE — PROGRESS NOTES
Normal CMP, lipids, CBC, testosterone level  A1c still prediabetic range, improved from 4 months ago

## 2021-10-13 PROBLEM — N92.6 IRREGULAR MENSES: Status: ACTIVE | Noted: 2021-10-13

## 2021-10-20 ENCOUNTER — OFFICE VISIT (OUTPATIENT)
Dept: FAMILY MEDICINE CLINIC | Age: 21
End: 2021-10-20
Payer: MEDICAID

## 2021-10-20 VITALS
HEART RATE: 67 BPM | WEIGHT: 201.8 LBS | HEIGHT: 69 IN | SYSTOLIC BLOOD PRESSURE: 112 MMHG | TEMPERATURE: 97.8 F | OXYGEN SATURATION: 99 % | DIASTOLIC BLOOD PRESSURE: 77 MMHG | BODY MASS INDEX: 29.89 KG/M2

## 2021-10-20 DIAGNOSIS — M79.632 LEFT FOREARM PAIN: ICD-10-CM

## 2021-10-20 DIAGNOSIS — S60.562A INSECT BITE OF LEFT HAND, INITIAL ENCOUNTER: ICD-10-CM
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
## 2021-10-20 PROCEDURE — 99213 OFFICE O/P EST LOW 20 MIN: CPT | Performed by: STUDENT IN AN ORGANIZED HEALTH CARE EDUCATION/TRAINING PROGRAM

## 2021-10-20 RX ORDER — METHOCARBAMOL 500 MG/1
TABLET, FILM COATED ORAL
COMMUNITY
Start: 2021-10-09 | End: 2021-11-15 | Stop reason: CLARIF

## 2021-10-20 RX ORDER — IBUPROFEN 600 MG/1
TABLET ORAL
COMMUNITY
Start: 2021-10-09

## 2021-10-20 NOTE — PATIENT INSTRUCTIONS
Try ice and/or heat to help with aches/pains    Continue ibuprofen and muscle relaxant. Decrease ibuprofen frequency and dose as able. Use OTC cortisone and ice for bug bites.   Monitor for signs of infection
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
## 2021-11-05 ENCOUNTER — TELEPHONE (OUTPATIENT)
Dept: FAMILY MEDICINE CLINIC | Age: 21
End: 2021-11-05
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
## 2021-11-15 ENCOUNTER — VIRTUAL VISIT (OUTPATIENT)
Dept: FAMILY MEDICINE CLINIC | Age: 21
End: 2021-11-15
Payer: MEDICAID

## 2021-11-15 DIAGNOSIS — F43.0 ACUTE STRESS REACTION: ICD-10-CM

## 2021-11-15 DIAGNOSIS — R07.89 CHEST WALL PAIN: ICD-10-CM
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
## 2021-11-15 DIAGNOSIS — G44.209 TENSION HEADACHE: ICD-10-CM

## 2021-11-15 DIAGNOSIS — R29.898 LEFT ARM WEAKNESS: ICD-10-CM

## 2021-11-15 PROCEDURE — 99214 OFFICE O/P EST MOD 30 MIN: CPT | Performed by: STUDENT IN AN ORGANIZED HEALTH CARE EDUCATION/TRAINING PROGRAM

## 2021-11-15 RX ORDER — CYCLOBENZAPRINE HCL 5 MG
5-10 TABLET ORAL
Qty: 60 TABLET | Refills: 0 | Status: SHIPPED | OUTPATIENT
Start: 2021-11-15
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
## 2021-11-15 NOTE — PATIENT INSTRUCTIONS
Lifestyle areas to focus on. .. Exercise  Nutrition  Sleep  Mindfulness - deep/diaphragmatic breathing, progressive muscle relaxation, guided imagery, yoga, maria guadalupe chi, meditation, prayer, Temple reading, journaling, coloring/art (practice 5 minutes, 3 times each day)  Social connectedness           Learning About Mindfulness for Stress  What are mindfulness and stress? Stress is what you feel when you have to handle more than you are used to. A lot of things can cause stress. You may feel stress when you go on a job interview, take a test, or run a race. This kind of short-term stress is normal and even useful. It can help you if you need to work hard or react quickly. Stress also can last a long time. Long-term stress is caused by stressful situations or events. Examples of long-term stress include long-term health problems, ongoing problems at work, and conflicts in your family. Long-term stress can harm your health. Mindfulness is a focus only on things happening in the present moment. It's a process of purposefully paying attention to and being aware of your surroundings, your emotions, your thoughts, and how your body feels. You are aware of these things, but you aren't judging these experiences as \"good\" or \"bad. \" Mindfulness can help you learn to calm your mind and body to help you cope with illness, pain, and stress. How does mindfulness help to relieve stress? Mindfulness can help quiet your mind and relax your body. Studies show that it can help some people sleep better, feel less anxious, and bring their blood pressure down. And it's been shown to help some people live and cope better with certain health problems like heart disease, depression, chronic pain, and cancer. How do you practice mindfulness? To be mindful is to pay attention, to be present, and to be accepting. · When you're mindful, you do just one thing and you pay close attention to that one thing.  For example, you may sit quietly and notice your emotions or how your food tastes and smells. · When you're present, you focus on the things that are happening right now. You let go of your thoughts about the past and the future. When you dwell on the past or the future, you miss moments that can heal and strengthen you. You may miss moments like hearing a child laugh or seeing a friendly face when you think you're all alone. · When you're accepting, you don't  the present moment. Instead you accept your thoughts and feelings as they come. You can practice anytime, anywhere, and in any way you choose. You can practice in many ways. Here are a few ideas:  · While doing your chores, like washing the dishes, let your mind focus on what's in your hand. What does the dish feel like? Is the water warm or cold? · Go outside and take a few deep breaths. What is the air like? Is it warm or cold? · When you can, take some time at the start of your day to sit alone and think. · Take a slow walk by yourself. Count your steps while you breathe in and out. · Try yoga breathing exercises, stretches, and poses to strengthen and relax your muscles. · At work, if you can, try to stop for a few moments each hour. Note how your body feels. Let yourself regroup and let your mind settle before you return to what you were doing. · If you struggle with anxiety or \"worry thoughts,\" imagine your mind as a blue dilia and your worry thoughts as clouds. Now imagine those worry thoughts floating across your mind's dilia. Just let them pass by as you watch. Follow-up care is a key part of your treatment and safety. Be sure to make and go to all appointments, and call your doctor if you are having problems. It's also a good idea to know your test results and keep a list of the medicines you take. Where can you learn more?   Go to http://www.gray.com/  Enter M676 in the search box to learn more about \"Learning About Mindfulness for Stress. \"  Current as of: June 16, 2021               Content Version: 13.0  © 5301-4555 [employer]. Care instructions adapted under license by [employer] (which disclaims liability or warranty for this information). If you have questions about a medical condition or this instruction, always ask your healthcare professional. [place] [address] any warranty or liability for your use of this information. Neck Spasm: Exercises  Introduction  Here are some examples of exercises for you to try. The exercises may be suggested for a condition or for rehabilitation. Start each exercise slowly. Ease off the exercises if you start to have pain. You will be told when to start these exercises and which ones will work best for you. How to do the exercises  Levator scapula stretch    1. Sit in a firm chair, or stand up straight. 2. Gently tilt your head toward your left shoulder. 3. Turn your head to look down into your armpit, bending your head slightly forward. Let the weight of your head stretch your neck muscles. 4. Hold for 15 to 30 seconds. 5. Return to your starting position. 6. Follow the same instructions above, but tilt your head toward your right shoulder. 7. Repeat 2 to 4 times toward each shoulder. Upper trapezius stretch    1. Sit in a firm chair, or stand up straight. 2. This stretch works best if you keep your shoulder down as you lean away from it. To help you remember to do this, start by relaxing your shoulders and lightly holding on to your thighs or your chair. 3. Tilt your head toward your shoulder and hold for 15 to 30 seconds. Let the weight of your head stretch your muscles. 4. If you would like a little added stretch, place your arm behind your back. Use the arm opposite of the direction you are tilting your head. For example, if you are tilting your head to the left, place your right arm behind your back.   5. Repeat 2 to 4 times toward each shoulder. Neck rotation    1. Sit in a firm chair, or stand up straight. 2. Keeping your chin level, turn your head to the right, and hold for 15 to 30 seconds. 3. Turn your head to the left, and hold for 15 to 30 seconds. 4. Repeat 2 to 4 times to each side. Chin tuck    1. Lie on the floor with a rolled-up towel under your neck. Your head should be touching the floor. 2. Slowly bring your chin toward the front of your neck. 3. Hold for a count of 6, and then relax for up to 10 seconds. 4. Repeat 8 to 12 times. Forward neck flexion    1. Sit in a firm chair, or stand up straight. 2. Bend your head forward. 3. Hold for 15 to 30 seconds, then return to your starting position. 4. Repeat 2 to 4 times. Follow-up care is a key part of your treatment and safety. Be sure to make and go to all appointments, and call your doctor if you are having problems. It's also a good idea to know your test results and keep a list of the medicines you take. Where can you learn more? Go to http://www.gray.com/  Enter P962 in the search box to learn more about \"Neck Spasm: Exercises. \"  Current as of: July 1, 2021               Content Version: 13.0  © 2006-2021 Healthwise[employer] Incorporated. Care instructions adapted under license by [employer] (which disclaims liability or warranty for this information). If you have questions about a medical condition or this instruction, always ask your healthcare professional. [place] [address] any warranty or liability for your use of this information.

## 2021-11-15 NOTE — ASSESSMENT & PLAN NOTE
History consistent with tension headaches, 3 times per week  Encouraged use of NSAIDs, muscle relaxants, neck stretches/PT, and stress management

## 2021-11-15 NOTE — ASSESSMENT & PLAN NOTE
History consistent with musculoskeletal injury. Encouraged to work with physical therapy, use NSAIDs for pain.

## 2021-12-23 ENCOUNTER — DOCUMENTATION ONLY (OUTPATIENT)
Dept: FAMILY MEDICINE CLINIC | Age: 21
End: 2021-12-23

## 2021-12-23 NOTE — PROGRESS NOTES
[facility] [employer] medical records request was faxed to [employer] at [phone] to be processed on 12/23/2021.

## 2022-01-18 ENCOUNTER — VIRTUAL VISIT (OUTPATIENT)
Dept: FAMILY MEDICINE CLINIC | Age: 22
End: 2022-01-18
Payer: MEDICAID
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
## 2022-01-18 DIAGNOSIS — G44.209 TENSION HEADACHE: ICD-10-CM

## 2022-01-18 DIAGNOSIS — R73.03 PREDIABETES: ICD-10-CM

## 2022-01-18 DIAGNOSIS — F41.9 ANXIETY: Primary | ICD-10-CM

## 2022-01-18 DIAGNOSIS — R29.898 LEFT ARM WEAKNESS: ICD-10-CM
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
## 2022-01-18 PROBLEM — S60.562A INSECT BITE OF LEFT HAND: Status: RESOLVED | Noted: 2021-10-20 | Resolved: 2022-01-18

## 2022-01-18 PROCEDURE — 99214 OFFICE O/P EST MOD 30 MIN: CPT | Performed by: STUDENT IN AN ORGANIZED HEALTH CARE EDUCATION/TRAINING PROGRAM

## 2022-01-18 NOTE — PATIENT INSTRUCTIONS
Learning About Mindfulness for Stress  What are mindfulness and stress? Stress is what you feel when you have to handle more than you are used to. A lot of things can cause stress. You may feel stress when you go on a job interview, take a test, or run a race. This kind of short-term stress is normal and even useful. It can help you if you need to work hard or react quickly. Stress also can last a long time. Long-term stress is caused by stressful situations or events. Examples of long-term stress include long-term health problems, ongoing problems at work, and conflicts in your family. Long-term stress can harm your health. Mindfulness is a focus only on things happening in the present moment. It's a process of purposefully paying attention to and being aware of your surroundings, your emotions, your thoughts, and how your body feels. You are aware of these things, but you aren't judging these experiences as \"good\" or \"bad. \" Mindfulness can help you learn to calm your mind and body to help you cope with illness, pain, and stress. How does mindfulness help to relieve stress? Mindfulness can help quiet your mind and relax your body. Studies show that it can help some people sleep better, feel less anxious, and bring their blood pressure down. And it's been shown to help some people live and cope better with certain health problems like heart disease, depression, chronic pain, and cancer. How do you practice mindfulness? To be mindful is to pay attention, to be present, and to be accepting. · When you're mindful, you do just one thing and you pay close attention to that one thing. For example, you may sit quietly and notice your emotions or how your food tastes and smells. · When you're present, you focus on the things that are happening right now. You let go of your thoughts about the past and the future. When you dwell on the past or the future, you miss moments that can heal and strengthen you.  You may miss moments like hearing a child laugh or seeing a friendly face when you think you're all alone. · When you're accepting, you don't  the present moment. Instead you accept your thoughts and feelings as they come. You can practice anytime, anywhere, and in any way you choose. You can practice in many ways. Here are a few ideas:  · While doing your chores, like washing the dishes, let your mind focus on what's in your hand. What does the dish feel like? Is the water warm or cold? · Go outside and take a few deep breaths. What is the air like? Is it warm or cold? · When you can, take some time at the start of your day to sit alone and think. · Take a slow walk by yourself. Count your steps while you breathe in and out. · Try yoga breathing exercises, stretches, and poses to strengthen and relax your muscles. · At work, if you can, try to stop for a few moments each hour. Note how your body feels. Let yourself regroup and let your mind settle before you return to what you were doing. · If you struggle with anxiety or \"worry thoughts,\" imagine your mind as a blue dilia and your worry thoughts as clouds. Now imagine those worry thoughts floating across your mind's dilia. Just let them pass by as you watch. Follow-up care is a key part of your treatment and safety. Be sure to make and go to all appointments, and call your doctor if you are having problems. It's also a good idea to know your test results and keep a list of the medicines you take. Where can you learn more? Go to http://www.gray.com/  Enter M676 in the search box to learn more about \"Learning About Mindfulness for Stress. \"  Current as of: June 16, 2021               Content Version: 13.0  © 6218-3619 Healthwise, Incorporated. Care instructions adapted under license by [employer] (which disclaims liability or warranty for this information).  If you have questions about a medical condition or this instruction, always ask your healthcare professional. [place] [address] any warranty or liability for your use of this information.

## 2022-01-18 NOTE — ASSESSMENT & PLAN NOTE
Not interested in medication or therapy at this time. Reviewed additional coping strategies, encouraged to work on cognitive restructuring.
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
## 2022-01-18 NOTE — ASSESSMENT & PLAN NOTE
Reviewed last labs. Recommended follow-up at 6-12 months, sooner if weight gain or poor lifestyle habits.
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
## 2022-03-18 PROBLEM — R73.03 PREDIABETES: Status: ACTIVE | Noted: 2021-05-28

## 2022-03-18 PROBLEM — N92.6 IRREGULAR MENSES: Status: ACTIVE | Noted: 2021-10-13

## 2022-03-19 PROBLEM — F41.9 ANXIETY: Status: ACTIVE | Noted: 2021-11-15

## 2022-03-19 PROBLEM — G44.209 TENSION HEADACHE: Status: ACTIVE | Noted: 2021-11-15

## 2022-03-19 PROBLEM — N94.6 DYSMENORRHEA IN ADOLESCENT: Status: ACTIVE | Noted: 2017-07-13

## 2022-03-20 PROBLEM — R07.89 CHEST WALL PAIN: Status: ACTIVE | Noted: 2021-11-15

## 2022-04-04 ENCOUNTER — OFFICE VISIT (OUTPATIENT)
Dept: OBGYN CLINIC | Age: 22
End: 2022-04-04
Payer: MEDICAID

## 2022-04-04 VITALS — DIASTOLIC BLOOD PRESSURE: 72 MMHG | WEIGHT: 194.4 LBS | SYSTOLIC BLOOD PRESSURE: 122 MMHG | BODY MASS INDEX: 28.71 KG/M2

## 2022-04-04 DIAGNOSIS — N94.6 DYSMENORRHEA: ICD-10-CM

## 2022-04-04 DIAGNOSIS — Z80.3 FAMILY HISTORY OF BREAST CANCER: ICD-10-CM

## 2022-04-04 DIAGNOSIS — Z11.3 SCREENING FOR VENEREAL DISEASE: ICD-10-CM

## 2022-04-04 DIAGNOSIS — Z01.419 WELL WOMAN EXAM: Primary | ICD-10-CM

## 2022-04-04 PROCEDURE — 99395 PREV VISIT EST AGE 18-39: CPT | Performed by: OBSTETRICS & GYNECOLOGY
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
## 2022-04-13 LAB
C TRACH RRNA CVX QL NAA+PROBE: NEGATIVE
CYTOLOGIST CVX/VAG CYTO: ABNORMAL
CYTOLOGY CVX/VAG DOC CYTO: ABNORMAL
CYTOLOGY CVX/VAG DOC THIN PREP: ABNORMAL
DX ICD CODE: ABNORMAL
DX ICD CODE: ABNORMAL
HPV I/H RISK 4 DNA CVX QL PROBE+SIG AMP: POSITIVE
LABCORP, 190119: ABNORMAL
Lab: ABNORMAL
N GONORRHOEA RRNA CVX QL NAA+PROBE: NEGATIVE
OTHER STN SPEC: ABNORMAL
PATHOLOGIST CVX/VAG CYTO: ABNORMAL
STAT OF ADQ CVX/VAG CYTO-IMP: ABNORMAL
T VAGINALIS RRNA SPEC QL NAA+PROBE: NEGATIVE

## 2022-04-25 ENCOUNTER — DOCUMENTATION ONLY (OUTPATIENT)
Dept: FAMILY MEDICINE CLINIC | Age: 22
End: 2022-04-25

## 2022-04-25 NOTE — PROGRESS NOTES
[id number] [facility] request for medical records was faxed to [facility] 34 [phone] to be processed. ( 2nd request)

## 2022-05-03 ENCOUNTER — DOCUMENTATION ONLY (OUTPATIENT)
Dept: FAMILY MEDICINE CLINIC | Age: 22
End: 2022-05-03

## 2022-05-03 NOTE — PROGRESS NOTES
[address] notarized itemized bill was faxed to [phone] on 5/2/2022 attn Hailey Gallagher

## 2022-05-23 ENCOUNTER — VIRTUAL VISIT (OUTPATIENT)
Dept: FAMILY MEDICINE CLINIC | Age: 22
End: 2022-05-23
Payer: MEDICAID

## 2022-05-23 DIAGNOSIS — F51.04 PSYCHOPHYSIOLOGICAL INSOMNIA: ICD-10-CM

## 2022-05-23 DIAGNOSIS — R41.840 ATTENTION DEFICIT: ICD-10-CM

## 2022-05-23 PROCEDURE — 99214 OFFICE O/P EST MOD 30 MIN: CPT | Performed by: STUDENT IN AN ORGANIZED HEALTH CARE EDUCATION/TRAINING PROGRAM

## 2022-05-23 NOTE — ASSESSMENT & PLAN NOTE
ADHD vs uncontrolled mood disorder  She is not interested in medication for mood at this time. Discussed having testing done for ADHD, follow-up based on results.

## 2022-05-23 NOTE — PROGRESS NOTES
Progress Note    she is a 24y.o. year old female who presents for evalution. Assessment/ Plan:   Diagnoses and all orders for this visit:    1. Psychophysiological insomnia  Assessment & Plan:  Discussed sleep hygiene and use of melatonin. Discussed option for prescription medication. Responded well to TCA in the past.  She would like to hold off on medication at this time. 2. Attention deficit  Assessment & Plan:  ADHD vs uncontrolled mood disorder  She is not interested in medication for mood at this time. Discussed having testing done for ADHD, follow-up based on results. Follow-up and Dispositions    · Return in about 4 weeks (around 6/20/2022) for follow-up insomnia. I have discussed the diagnosis with the patient and the intended plan as seen in the above orders. The patient has received an after-visit summary and questions were answered concerning future plans. Pt conveyed understanding of plan. Medication Side Effects and Warnings were discussed with patient      Subjective:     Chief Complaint   Patient presents with    Sleep Problem     x 2 months     Other     attention span has decreased      Issues sleeping the past couple of months   Trouble falling asleep despite feeling super tired   Feels like she just stares at the wall for 2-3 hours. When she falls asleep she has trouble staying asleep. [age] yo when first had trouble sleeping. Given rx by Ms. Estrada    Amitriptyline, helped for about a year. Bedtime routine: shower, eat (something small, nothing sugary or caffeine), tv (turns off when she feels herself dozing)    Attention span is \"weird\"   \"can't focus on anything for a long time\"   Just started realizing it but feels it has been going on longer than sleep problems have been   No previous ADHD testing    Mood has been off   Feeling a little bit depressed   Half the days of the week are good. Anxiety hasn't worsened.         Reviewed PmHx, RxHx, FmHx, SocHx, AllgHx and updated and dated in the chart. Review of Systems - negative except as listed above in the HPI      Objective: There were no vitals filed for this visit. Current Outpatient Medications   Medication Sig    cyclobenzaprine (FLEXERIL) 5 mg tablet Take 1-2 Tablets by mouth three (3) times daily as needed for Muscle Spasm(s). (Patient not taking: Reported on 4/4/2022)    ibuprofen (MOTRIN) 600 mg tablet  (Patient not taking: Reported on 4/4/2022)    diphenhydrAMINE (BenadryL) 25 mg capsule Take 25 mg by mouth every six (6) hours as needed. (Patient not taking: Reported on 4/4/2022)     No current facility-administered medications for this visit. Physical Exam  Vitals and nursing note reviewed. Constitutional:       General: She is not in acute distress. Appearance: Normal appearance. She is not ill-appearing, toxic-appearing or diaphoretic. HENT:      Head: Normocephalic and atraumatic. Eyes:      General: No scleral icterus. Right eye: No discharge. Left eye: No discharge. Conjunctiva/sclera: Conjunctivae normal.   Pulmonary:      Effort: Pulmonary effort is normal. No respiratory distress. Musculoskeletal:      Cervical back: No rigidity. Skin:     Coloration: Skin is not jaundiced or pale. Findings: No rash (Of the visualized areas). Neurological:      Mental Status: She is alert. Comments: No dysarthria, facial asymmetry, or other gross neurological deficit appreciated within limits of virtual encounter   Psychiatric:         Mood and Affect: Mood normal.         Behavior: Behavior normal.         Thought Content: Thought content normal.         Judgment: Judgment normal.               I was in the office while conducting this encounter. Total Time: minutes: 11-20 minutes. Dajuan Haskins is a 24 y.o. female being evaluated by a Virtual Visit (video visit) encounter to address concerns as mentioned above.   A caregiver was present when appropriate. Due to this being a TeleHealth encounter (During AVOTW-28 public health emergency), evaluation of the following organ systems was limited: Vitals/Constitutional/EENT/Resp/CV/GI//MS/Neuro/Skin/Heme-Lymph-Imm. Pursuant to the emergency declaration under the [address] [place] [address], [address] [place] [address] and the [employer] Act, this Virtual Visit was conducted with patient's (and/or legal guardian's) consent, to reduce the risk of exposure to COVID-19 and provide necessary medical care. Services were provided through a video synchronous discussion virtually to substitute for in-person encounter. --Katerina Murphy MD on 5/23/2022 at 9:40 AM    An electronic signature was used to authenticate this note.

## 2022-05-23 NOTE — ASSESSMENT & PLAN NOTE
Discussed sleep hygiene and use of melatonin. Discussed option for prescription medication. Responded well to [facility] in the past.  She would like to hold off on medication at this time.

## 2022-05-23 NOTE — PROGRESS NOTES
Desi Avalos is a 24 y.o. female , id x 2(name and ). Reviewed questionnaires, and  medications. Chief Complaint   Patient presents with    Sleep Problem     x 2 months     Other     attention span has decreased        3 most recent PHQ Screens 2022   Little interest or pleasure in doing things Not at all   Feeling down, depressed, irritable, or hopeless Not at all   Total Score PHQ 2 0   In the past year have you felt depressed or sad most days, even if you felt okay? -   Has there been a time in the past month when you have had serious thoughts about ending your life?  -   Have you ever in your whole life, tried to kill yourself or made a suicide attempt? -

## 2022-05-23 NOTE — PATIENT INSTRUCTIONS
Testing for ADHD:    [facility]  [address]  [place]  [phone]    [address], [facility] [address]  [phone]    [email or web address]    [facility]  [address]  [address] [facility]  [phone]  [email or web address]    [facility]  [facility]  [address], [address]  [place], [address]  [phone]    [address]  [address] [facility], [place] [address]  [phone]      Insomnia:  Take melatonin 2 hours before bedtime. No screens in the 1-2 hours before bedtime. Come up with a relaxing bedtime routine. Limit caffeine after noon and none within 8 hours of bedtime. Try to keep a consistent bedtime and waketime so you're body can know its own schedule. Learning About Sleeping Well  What does sleeping well mean? Sleeping well means getting enough sleep to feel good and stay healthy. How much sleep is enough varies among people. The number of hours you sleep and how you feel when you wake up are both important. If you do not feel refreshed, you probably need more sleep. Another sign of not getting enough sleep is feeling tired during the day. Experts recommend that adults get at least 7 or more hours of sleep per day. Children and older adults need more sleep. Why is getting enough sleep important? Getting enough quality sleep is a basic part of good health. When your sleep suffers, your physical health, mood, and your thoughts can suffer too. You may find yourself feeling more grumpy or stressed. Not getting enough sleep also can lead to serious problems, including injury, accidents, anxiety, and depression. What might cause poor sleeping? Many things can cause sleep problems, including:  · Changes to your sleep schedule. · Stress.  Stress can be caused by fear about a single event, such as giving a speech. Or you may have ongoing stress, such as worry about work or school. · Depression, anxiety, and other mental or emotional conditions. · Changes in your sleep habits or surroundings. This includes changes that happen where you sleep, such as noise, light, or sleeping in a different bed. It also includes changes in your sleep pattern, such as having jet lag or working a late shift. · Health problems, such as pain, breathing problems, and restless legs syndrome. · Lack of regular exercise. · Using alcohol, nicotine, or caffeine before bed. How can you help yourself? Here are some tips that may help you sleep more soundly and wake up feeling more refreshed. Your sleeping area   · Use your bedroom only for sleeping and sex. A bit of light reading may help you fall asleep. But if it doesn't, do your reading elsewhere in the house. Try not to use your TV, computer, smartphone, or tablet while you are in bed. · Be sure your bed is big enough to stretch out comfortably, especially if you have a sleep partner. · Keep your bedroom quiet, dark, and cool. Use curtains, blinds, or a sleep mask to block out light. To block out noise, use earplugs, soothing music, or a \"white noise\" machine. Your evening and bedtime routine   · Create a relaxing bedtime routine. You might want to take a warm shower or bath, or listen to soothing music. · Go to bed at the same time every night. And get up at the same time every morning, even if you feel tired. What to avoid   · Limit caffeine (coffee, tea, caffeinated sodas) during the day, and don't have any for at least 6 hours before bedtime. · Avoid drinking alcohol before bedtime. Alcohol can cause you to wake up more often during the night. · Try not to smoke or use tobacco, especially in the evening. Nicotine can keep you awake. · Limit naps during the day, especially close to bedtime. · Avoid lying in bed awake for too long.  If you can't fall asleep or if you wake up in the middle of the night and can't get back to sleep within about 20 minutes, get out of bed and go to another room until you feel sleepy. · Avoid taking medicine right before bed that may keep you awake or make you feel hyper or energized. Your doctor can tell you if your medicine may do this and if you can take it earlier in the day. If you can't sleep   · Imagine yourself in a peaceful, pleasant scene. Focus on the details and feelings of being in a place that is relaxing. · Get up and do a quiet or boring activity until you feel sleepy. · Avoid drinking any liquids before going to bed to help prevent waking up often to use the bathroom. Where can you learn more? Go to http://www.gray.com/  Enter P787 in the search box to learn more about \"Learning About Sleeping Well. \"  Current as of: June 16, 2021               Content Version: 13.2  © 2006-2022 Healthwise, Incorporated. Care instructions adapted under license by [employer] (which disclaims liability or warranty for this information). If you have questions about a medical condition or this instruction, always ask your healthcare professional. [place] [address] any warranty or liability for your use of this information.

## 2022-06-22 ENCOUNTER — NURSE TRIAGE (OUTPATIENT)
Dept: OTHER | Facility: CLINIC | Age: 22
End: 2022-06-22

## 2022-07-08 ENCOUNTER — OFFICE VISIT (OUTPATIENT)
Dept: FAMILY MEDICINE CLINIC | Age: 22
End: 2022-07-08
Payer: MEDICAID

## 2022-07-08 VITALS
DIASTOLIC BLOOD PRESSURE: 69 MMHG | BODY MASS INDEX: 28.29 KG/M2 | HEART RATE: 67 BPM | RESPIRATION RATE: 18 BRPM | SYSTOLIC BLOOD PRESSURE: 104 MMHG | HEIGHT: 69 IN | OXYGEN SATURATION: 100 % | WEIGHT: 191 LBS | TEMPERATURE: 98.8 F

## 2022-07-08 DIAGNOSIS — F41.8 SITUATIONAL ANXIETY: ICD-10-CM

## 2022-07-08 DIAGNOSIS — G43.009 MIGRAINE WITHOUT AURA AND WITHOUT STATUS MIGRAINOSUS, NOT INTRACTABLE: Primary | ICD-10-CM

## 2022-07-08 PROCEDURE — 99213 OFFICE O/P EST LOW 20 MIN: CPT | Performed by: NURSE PRACTITIONER

## 2022-07-08 RX ORDER — TOPIRAMATE 50 MG/1
50 TABLET, FILM COATED ORAL
COMMUNITY
Start: 2022-06-27

## 2022-07-08 RX ORDER — BUTALBITAL, ACETAMINOPHEN AND CAFFEINE 50; 325; 40 MG/1; MG/1; MG/1
TABLET ORAL
COMMUNITY
Start: 2022-06-27

## 2022-07-08 RX ORDER — ONDANSETRON 4 MG/1
TABLET, ORALLY DISINTEGRATING ORAL
COMMUNITY
Start: 2022-06-27 | End: 2022-10-14 | Stop reason: SDUPTHER

## 2022-07-08 NOTE — PROGRESS NOTES
Chief Complaint   Patient presents with    Headache    ED Follow-up       Patient in office today for HA that was noted daily since 1.5 months ago. Increased stress. No changes in routine or diet. Brother recently came home and that's been an extra burden. Pain was located at temporals but would radiate to top or back of head. Both sides. Pt did note nausea intermittently usually with a worse HA, phono and photosensitivity. Pt denies blurred vision or vomiting. Sometimes woke up with HA, sometimes came as the day went by. Sometimes would go to sleep with HA. Pt was seen at [facility] one wk ago due to frequent HA. They did blood work and per pt everything was normal. Thinks she also was prescribed a steroid dose pack. Was started on topamax and fioricet prn. Currently taking 25 mg nightly with good results. Have noted a decrease in HA since starting topamax-on average 2 per week. Last headache was yesterday. Has one right now but attributing to the fact that she hasn't eaten yet today. Denies any SEs from the topamax. Fioricet will resolve HA -unsure of when medication will relief sx, causes drowsiness. Would like to stay on the fioricet for now. Will treat with tylenol or ibuprofen prn during the day. Family hx of migraines-mother. Denies any personal history of migraines. Pt does not have neurology appt. Chief Complaint   Patient presents with    Headache    ED Follow-up     she is a 24y.o. year old female who presents for evalution. Reviewed PmHx, RxHx, FmHx, SocHx, AllgHx and updated and dated in the chart.     Review of Systems - negative except as listed above in the HPI    Objective:     Vitals:    07/08/22 1002   BP: 104/69   Pulse: 67   Resp: 18   Temp: 98.8 °F (37.1 °C)   TempSrc: Oral   SpO2: 100%   Weight: 191 lb (86.6 kg)   Height: 5' 9\" (1.753 m)     Physical Examination: General appearance - alert, well appearing, and in no distress  Mental status - normal mood, behavior, anxious when discussing stressors but redirectable  Eyes - pupils equal and reactive, extraocular eye movements intact  Ears - bilateral TM's and external ear canals normal  Nose - normal and patent, no erythema, discharge or polyps  Mouth - mucous membranes moist, pharynx normal without lesions  Neck - supple, no significant adenopathy  Chest - clear to auscultation, no wheezes, rales or rhonchi, symmetric air entry  Heart - normal rate, regular rhythm, normal S1, S2, no murmurs    Assessment/ Plan:   Diagnoses and all orders for this visit:    1. Migraine without aura and without status migrainosus, not intractable  Doing well after starting topamax at bedtime. Continue for now. Okay to continue the fioricet PRN for acute HA. Reviewed risk for rebound HAs with overuse and pt verbalized understanding of this. Reviewed other possible triggers to consider. Monitor closely. Reach out with any new or worsening sx. 2. Situational anxiety  Recommended pt try OTC ashwaghanda. Follow up if anxiety sx persist or worsen to discuss alt treatment options. Follow-up and Dispositions    · Return if symptoms worsen or fail to improve. I have discussed the diagnosis with the patient and the intended plan as seen in the above orders. The patient has received an after-visit summary and questions were answered concerning future plans. Medication Side Effects and Warnings were discussed with patient: yes  Patient Labs were reviewed and or requested: no  Patient Past Records were reviewed and or requested  yes  Patient / Caregiver Understanding of treatment plan was verbalized during office visit MARCELO Luke    Patient Instructions          Migraine Headache: Care Instructions  Overview     Migraines are painful, throbbing headaches that often start on one side of the head. They may cause nausea and vomiting and make you sensitive to light, sound, or smell.   Without treatment, migraines can last from 4 hours to a few days. Medicines can help prevent migraines or stop them after they have started. Your doctor can help you find which ones work best for you. Follow-up care is a key part of your treatment and safety. Be sure to make and go to all appointments, and call your doctor if you are having problems. It's also a good idea to know your test results and keep a list of the medicines you take. How can you care for yourself at home? · Do not drive if you have taken a prescription pain medicine. · Rest in a quiet, dark room until your headache is gone. Close your eyes, and try to relax or go to sleep. Don't watch TV or read. · Put a cold, moist cloth or cold pack on the painful area for 10 to 20 minutes at a time. Put a thin cloth between the cold pack and your skin. · Use a warm, moist towel or a heating pad set on low to relax tight shoulder and neck muscles. · Have someone gently massage your neck and shoulders. · Take your medicines exactly as prescribed. Call your doctor if you think you are having a problem with your medicine. You will get more details on the specific medicines your doctor prescribes. · Don't take medicine for headache pain too often. Talk to your doctor if you are taking medicine more than 2 days a week to stop a headache. Taking too much pain medicine can lead to more headaches. These are called medicine-overuse headaches. To prevent migraines  · Keep a headache diary so you can figure out what triggers your headaches. Avoiding triggers may help you prevent headaches. Record when each headache began, how long it lasted, and what the pain was like. Write down any other symptoms you had with the headache, such as nausea, flashing lights or dark spots, or sensitivity to bright light or loud noise. Note if the headache occurred near your period. List anything that might have triggered the headache.  Triggers may include certain foods (chocolate, cheese, wine) or odors, smoke, bright light, stress, or lack of sleep. · If your doctor has prescribed medicine for your migraines, take it as directed. You may have medicine that you take only when you get a migraine and medicine that you take all the time to help prevent migraines. ? If your doctor has prescribed medicine for when you get a headache, take it at the first sign of a migraine, unless your doctor has given you other instructions. ? If your doctor has prescribed medicine to prevent migraines, take it exactly as prescribed. Call your doctor if you think you are having a problem with your medicine. · Find healthy ways to deal with stress. Migraines are most common during or right after stressful times. Try finding ways to reduce stress like practicing mindfulness or deep breathing exercises. · Get plenty of sleep and exercise. But be careful to not push yourself too hard during exercise. It may trigger a headache. · Eat meals on a regular schedule. Avoid foods and drinks that often trigger migraines. These include chocolate, alcohol (especially red wine and port), aspartame, monosodium glutamate (MSG), and some additives found in foods (such as hot dogs, king, cold cuts, aged cheeses, and pickled foods). · Limit caffeine. Don't drink too much coffee, tea, or soda. But don't quit caffeine suddenly. That can also give you migraines. · Do not smoke or allow others to smoke around you. If you need help quitting, talk to your doctor about stop-smoking programs and medicines. These can increase your chances of quitting for good. · If you are taking birth control pills or hormone therapy, talk to your doctor about whether they are triggering your migraines. When should you call for help? Call 911 anytime you think you may need emergency care. For example, call if:    · You have signs of a stroke.  These may include:  ? Sudden numbness, paralysis, or weakness in your face, arm, or leg, especially on only one side of your body.  ? Sudden vision changes. ? Sudden trouble speaking. ? Sudden confusion or trouble understanding simple statements. ? Sudden problems with walking or balance. ? A sudden, severe headache that is different from past headaches. Call your doctor now or seek immediate medical care if:    · You have new or worse nausea and vomiting.     · You have a new or higher fever.     · Your headache gets much worse. Watch closely for changes in your health, and be sure to contact your doctor if:    · You are not getting better after 2 days (48 hours). Where can you learn more? Go to http://www.gray.com/  Enter P889 in the search box to learn more about \"Migraine Headache: Care Instructions. \"  Current as of: December 13, 2021               Content Version: 13.2  © 2006-2022 Healthwise, Incorporated. Care instructions adapted under license by [employer] (which disclaims liability or warranty for this information). If you have questions about a medical condition or this instruction, always ask your healthcare professional. [place] [address] any warranty or liability for your use of this information.

## 2022-07-08 NOTE — PATIENT INSTRUCTIONS
Migraine Headache: Care Instructions  Overview     Migraines are painful, throbbing headaches that often start on one side of the head. They may cause nausea and vomiting and make you sensitive to light, sound, or smell. Without treatment, migraines can last from 4 hours to a few days. Medicines can help prevent migraines or stop them after they have started. Your doctor can help you find which ones work best for you. Follow-up care is a key part of your treatment and safety. Be sure to make and go to all appointments, and call your doctor if you are having problems. It's also a good idea to know your test results and keep a list of the medicines you take. How can you care for yourself at home? · Do not drive if you have taken a prescription pain medicine. · Rest in a quiet, dark room until your headache is gone. Close your eyes, and try to relax or go to sleep. Don't watch TV or read. · Put a cold, moist cloth or cold pack on the painful area for 10 to 20 minutes at a time. Put a thin cloth between the cold pack and your skin. · Use a warm, moist towel or a heating pad set on low to relax tight shoulder and neck muscles. · Have someone gently massage your neck and shoulders. · Take your medicines exactly as prescribed. Call your doctor if you think you are having a problem with your medicine. You will get more details on the specific medicines your doctor prescribes. · Don't take medicine for headache pain too often. Talk to your doctor if you are taking medicine more than 2 days a week to stop a headache. Taking too much pain medicine can lead to more headaches. These are called medicine-overuse headaches. To prevent migraines  · Keep a headache diary so you can figure out what triggers your headaches. Avoiding triggers may help you prevent headaches. Record when each headache began, how long it lasted, and what the pain was like.  Write down any other symptoms you had with the headache, such as nausea, flashing lights or dark spots, or sensitivity to bright light or loud noise. Note if the headache occurred near your period. List anything that might have triggered the headache. Triggers may include certain foods (chocolate, cheese, wine) or odors, smoke, bright light, stress, or lack of sleep. · If your doctor has prescribed medicine for your migraines, take it as directed. You may have medicine that you take only when you get a migraine and medicine that you take all the time to help prevent migraines. ? If your doctor has prescribed medicine for when you get a headache, take it at the first sign of a migraine, unless your doctor has given you other instructions. ? If your doctor has prescribed medicine to prevent migraines, take it exactly as prescribed. Call your doctor if you think you are having a problem with your medicine. · Find healthy ways to deal with stress. Migraines are most common during or right after stressful times. Try finding ways to reduce stress like practicing mindfulness or deep breathing exercises. · Get plenty of sleep and exercise. But be careful to not push yourself too hard during exercise. It may trigger a headache. · Eat meals on a regular schedule. Avoid foods and drinks that often trigger migraines. These include chocolate, alcohol (especially red wine and port), aspartame, monosodium glutamate (MSG), and some additives found in foods (such as hot dogs, king, cold cuts, aged cheeses, and pickled foods). · Limit caffeine. Don't drink too much coffee, tea, or soda. But don't quit caffeine suddenly. That can also give you migraines. · Do not smoke or allow others to smoke around you. If you need help quitting, talk to your doctor about stop-smoking programs and medicines. These can increase your chances of quitting for good. · If you are taking birth control pills or hormone therapy, talk to your doctor about whether they are triggering your migraines.   When should you call for help? Call 911 anytime you think you may need emergency care. For example, call if:    · You have signs of a stroke. These may include:  ? Sudden numbness, paralysis, or weakness in your face, arm, or leg, especially on only one side of your body. ? Sudden vision changes. ? Sudden trouble speaking. ? Sudden confusion or trouble understanding simple statements. ? Sudden problems with walking or balance. ? A sudden, severe headache that is different from past headaches. Call your doctor now or seek immediate medical care if:    · You have new or worse nausea and vomiting.     · You have a new or higher fever.     · Your headache gets much worse. Watch closely for changes in your health, and be sure to contact your doctor if:    · You are not getting better after 2 days (48 hours). Where can you learn more? Go to http://www.gray.com/  Enter P164 in the search box to learn more about \"Migraine Headache: Care Instructions. \"  Current as of: December 13, 2021               Content Version: 13.2  © 2006-2022 [employer]wise[employer]. Care instructions adapted under license by [employer] (which disclaims liability or warranty for this information). If you have questions about a medical condition or this instruction, always ask your healthcare professional. [address] any warranty or liability for your use of this information.

## 2022-07-08 NOTE — PROGRESS NOTES
Chief Complaint   Patient presents with    Headache    ED Follow-up         1. Patient in office today for HA that was noted daily since 1.5 months ago. Pain was located at temporals but would radiate to top or back of head. Pt did note nausea,light/noise sensitivity. Pt denies blurred vision or vomiting. Pt denies triggers for HA    2. Pt was seen at [facility] one wk ago due to frequent HA. Was started on topamax and fioricet prn. Have noted a decrease in HA since starting topamax-on average 2 per week. Fioricet will resolve HA-unsure of when medication will relief sx,causes drowsiness. Will treat with tylenol or ibuprofen prn during the day. Family hx of migraines-mother. Pt does note have neurology appt. 1. Have you been to the ER, urgent care clinic since your last visit? Hospitalized since your last visit? No    2. Have you seen or consulted any other health care providers outside of the [address] [place] [address] since your last visit? Include any pap smears or colon screening.  No

## 2022-10-14 ENCOUNTER — OFFICE VISIT (OUTPATIENT)
Dept: FAMILY MEDICINE CLINIC | Age: 22
End: 2022-10-14
Payer: MEDICAID

## 2022-10-14 VITALS
WEIGHT: 186.8 LBS | HEART RATE: 63 BPM | TEMPERATURE: 98.2 F | OXYGEN SATURATION: 99 % | BODY MASS INDEX: 27.67 KG/M2 | DIASTOLIC BLOOD PRESSURE: 72 MMHG | SYSTOLIC BLOOD PRESSURE: 106 MMHG | HEIGHT: 69 IN

## 2022-10-14 DIAGNOSIS — Z23 NEEDS FLU SHOT: ICD-10-CM

## 2022-10-14 DIAGNOSIS — R73.03 PREDIABETES: Primary | ICD-10-CM

## 2022-10-14 DIAGNOSIS — F43.21 SITUATIONAL DEPRESSION: ICD-10-CM

## 2022-10-14 DIAGNOSIS — Z00.00 ENCOUNTER FOR ANNUAL PHYSICAL EXAM: ICD-10-CM

## 2022-10-14 PROCEDURE — 90686 IIV4 VACC NO PRSV 0.5 ML IM: CPT | Performed by: NURSE PRACTITIONER

## 2022-10-14 PROCEDURE — 99214 OFFICE O/P EST MOD 30 MIN: CPT | Performed by: NURSE PRACTITIONER

## 2022-10-14 RX ORDER — ONDANSETRON 4 MG/1
TABLET, ORALLY DISINTEGRATING ORAL
Qty: 30 TABLET | Refills: 2 | Status: SHIPPED | OUTPATIENT
Start: 2022-10-14

## 2022-10-14 NOTE — PROGRESS NOTES
Sarahy Rojas is a 25 y.o. female , id x 2(name and ). Reviewed record, history, and  medications. Chief Complaint   Patient presents with    Physical    Immunization/Injection     Flu    Labs     Would like to check cholesterol        Vitals:    10/14/22 0827   Weight: 186 lb 12.8 oz (84.7 kg)   Height: 5' 9\" (1.753 m)       Coordination of Care Questionnaire:   1. Have you been to the ER, urgent care clinic since your last visit? Hospitalized since your last visit? No    2. Have you seen or consulted any other health care providers outside of the [address] [place] [address] since your last visit? Include any pap smears or colon screening. No      3 most recent PHQ Screens 10/14/2022   PHQ Not Done Urgent/emergent situation   Little interest or pleasure in doing things Nearly every day   Feeling down, depressed, irritable, or hopeless Nearly every day   Total Score PHQ 2 6   Trouble falling or staying asleep, or sleeping too much Nearly every day   Feeling tired or having little energy Nearly every day   Poor appetite, weight loss, or overeating Nearly every day   Feeling bad about yourself - or that you are a failure or have let yourself or your family down Not at all   Trouble concentrating on things such as school, work, reading, or watching TV Not at all   Moving or speaking so slowly that other people could have noticed; or the opposite being so fidgety that others notice Not at all   Thoughts of being better off dead, or hurting yourself in some way Not at all   PHQ 9 Score 15   How difficult have these problems made it for you to do your work, take care of your home and get along with others Not difficult at all   In the past year have you felt depressed or sad most days, even if you felt okay? -   Has there been a time in the past month when you have had serious thoughts about ending your life?  -   Have you ever in your whole life, tried to kill yourself or made a suicide attempt? -       Patient is accompanied by self I have received verbal consent from Angel Gomez to discuss any/all medical information while they are present in the room. After obtaining Gissel Yuan's consent, and per orders of Tate Herrera FNP, the vaccines ordered were given by Tawana Kemp LPN. Patient instructed to remain in clinic for 20 minutes afterwards, and to report any adverse reaction to me immediately. Patient did not display any adverse side effects. Pt / caregiver given opportunity to review vaccine information sheet prior to vaccine administration. Opportunity given for questions and concerns. No questions or concerns at this time.

## 2022-10-14 NOTE — PATIENT INSTRUCTIONS
Vaccine Information Statement    Influenza (Flu) Vaccine (Inactivated or Recombinant): What You Need to Know    Many vaccine information statements are available in [place]ak and other languages. See www.immunize.org/vis. Hojas de información sobre vacunas están disponibles en español y en muchos otros idiomas. Visite www.immunize.org/vis. 1. Why get vaccinated? Influenza vaccine can prevent influenza (flu). Flu is a contagious disease that spreads around the [facility] every year, usually between October and May. Anyone can get the flu, but it is more dangerous for some people. Infants and young children, people 72 years and older, pregnant people, and people with certain health conditions or a weakened immune system are at greatest risk of flu complications. Pneumonia, bronchitis, sinus infections, and ear infections are examples of flu-related complications. If you have a medical condition, such as heart disease, cancer, or diabetes, flu can make it worse. Flu can cause fever and chills, sore throat, muscle aches, fatigue, cough, headache, and runny or stuffy nose. Some people may have vomiting and diarrhea, though this is more common in children than adults. In an average year, thousands of people in the [facility] die from flu, and many more are hospitalized. Flu vaccine prevents millions of illnesses and flu-related visits to the doctor each year. 2. Influenza vaccines     CDC recommends everyone 6 months and older get vaccinated every flu season. Children 6 months through 6years of age may need 2 doses during a single flu season. Everyone else needs only 1 dose each flu season. It takes about 2 weeks for protection to develop after vaccination. There are many flu viruses, and they are always changing. Each year a new flu vaccine is made to protect against the influenza viruses believed to be likely to cause disease in the upcoming flu season.  Even when the vaccine doesnt exactly match these viruses, it may still provide some protection. Influenza vaccine does not cause flu. Influenza vaccine may be given at the same time as other vaccines. 3. Talk with your health care provider    Tell your vaccination provider if the person getting the vaccine:  Has had an allergic reaction after a previous dose of influenza vaccine, or has any severe, life-threatening allergies   Has ever had Guillain-Barré Syndrome (also called GBS)    In some cases, your health care provider may decide to postpone influenza vaccination until a future visit. Influenza vaccine can be administered at any time during pregnancy. People who are or will be pregnant during influenza season should receive inactivated influenza vaccine. People with minor illnesses, such as a cold, may be vaccinated. People who are moderately or severely ill should usually wait until they recover before getting influenza vaccine. Your health care provider can give you more information. 4. Risks of a vaccine reaction    Soreness, redness, and swelling where the shot is given, fever, muscle aches, and headache can happen after influenza vaccination. There may be a very small increased risk of Guillain-Barré Syndrome (GBS) after inactivated influenza vaccine (the flu shot). [facility] children who get the flu shot along with pneumococcal vaccine (PCV13) and/or DTaP vaccine at the same time might be slightly more likely to have a seizure caused by fever. Tell your health care provider if a child who is getting flu vaccine has ever had a seizure. People sometimes faint after medical procedures, including vaccination. Tell your provider if you feel dizzy or have vision changes or ringing in the ears. As with any medicine, there is a very remote chance of a vaccine causing a severe allergic reaction, other serious injury, or death. 5. What if there is a serious problem?     An allergic reaction could occur after the vaccinated person leaves the clinic. If you see signs of a severe allergic reaction (hives, swelling of the face and throat, difficulty breathing, a fast heartbeat, dizziness, or weakness), call 9-1-1 and get the person to the nearest hospital.    For other signs that concern you, call your health care provider. Adverse reactions should be reported to the Vaccine Adverse Event Reporting System (VAERS). Your health care provider will usually file this report, or you can do it yourself. Visit the VAERS website at www.vaers. [facility].gov or call 6-626.728.8700. VAERS is only for reporting reactions, and VAERS staff members do not give medical advice. 6. The National Vaccine Injury Compensation Program    The [facility] Vaccine Injury Compensation Program (VICP) is a federal program that was created to compensate people who may have been injured by certain vaccines. Claims regarding alleged injury or death due to vaccination have a time limit for filing, which may be as short as two years. Visit the VICP website at www.[facility].gov/vaccinecompensation or call 6-988.848.1459 to learn about the program and about filing a claim. 7. How can I learn more? Ask your health care provider. Call your local or state health department. Visit the website of the Food and Drug Administration (FDA) for vaccine package inserts and additional information at www.fda.gov/vaccines-blood-biologics/vaccines. Contact the Centers for Disease Control and Prevention (CDC): Call 5-361.700.4872 (1-800-CDC-INFO) or  Visit CDCs influenza website at www.cdc.gov/flu. Vaccine Information Statement   Inactivated Influenza Vaccine   8/6/2021  42 DAWNA Welch [id number]   Department of Health and Human Services  Centers for Disease Control and Prevention    Office Use Only

## 2022-10-14 NOTE — PROGRESS NOTES
Chief Complaint   Patient presents with    Physical    Immunization/Injection     Flu    Labs     Would like to check cholesterol      Scored a high score on depression screening. Recently lost her job. Situational.     Denies any cp, sob, and dyspnea. Denies any ha or dizziness. Denies any n/v/c/d. Taking zofran as needed for nausea related to migraines. Denies any urinary sx. Denies any other concerns at this time. Chief Complaint   Patient presents with    Physical    Immunization/Injection     Flu    Labs     Would like to check cholesterol      she is a 25y.o. year old female who presents for evalution. Reviewed PmHx, RxHx, FmHx, SocHx, AllgHx and updated and dated in the chart. Review of Systems - negative except as listed above in the HPI    Objective:     Vitals:    10/14/22 0827   BP: 106/72   Pulse: 63   Temp: 98.2 °F (36.8 °C)   SpO2: 99%   Weight: 186 lb 12.8 oz (84.7 kg)   Height: 5' 9\" (1.753 m)     Physical Examination: General appearance - alert, well appearing, and in no distress  Mental status - depressed mood but redirectable   Eyes - pupils equal and reactive, extraocular eye movements intact  Ears - bilateral TM's and external ear canals normal  Nose - normal and patent, no erythema, discharge or polyps and normal nontender sinuses  Mouth - mucous membranes moist, pharynx normal without lesions  Neck - supple, no significant adenopathy, carotids upstroke normal bilaterally, no bruits, thyroid exam: thyroid is normal in size without nodules or tenderness  Chest - clear to auscultation, no wheezes, rales or rhonchi, symmetric air entry  Heart - normal rate, regular rhythm, normal S1, S2  Extremities - peripheral pulses normal, no ankle edema, no clubbing or cyanosis  Skin - normal coloration and turgor    Assessment/ Plan:   Diagnoses and all orders for this visit:    1. Encounter for annual physical exam  -     LIPID PANEL;  Future  -     METABOLIC PANEL, COMPREHENSIVE; Future  -     CBC WITH AUTOMATED DIFF; Future  -     TSH 3RD GENERATION; Future  -     VITAMIN D, 25 HYDROXY; Future  Healthy appearing 25year old female. 2. Prediabetes  -     HEMOGLOBIN A1C WITH EAG; Future  Will notify results and deviate plan based on findings. 3. Situational depression  Pt not interested in starting medication for this. Situational surrounding loss of her job. Has support at home and applying a lot of places. We discussed possibly trying ashwaghanda. Enc pt to reach out if she decides she'd like to try medication. 4. Needs flu shot  -     INFLUENZA, FLUARIX, FLULAVAL, FLUZONE (AGE 6 MO+), AFLURIA(AGE 3Y+) IM, PF, 0.5 ML  Given. I have discussed the diagnosis with the patient and the intended plan as seen in the above orders. The patient has received an after-visit summary and questions were answered concerning future plans. Medication Side Effects and Warnings were discussed with patient: yes  Patient Labs were reviewed and or requested: yes  Patient Past Records were reviewed and or requested  yes  Patient / Caregiver Understanding of treatment plan was verbalized during office visit YES    MARCELO Michael    Patient Instructions   Vaccine Information Statement    Influenza (Flu) Vaccine (Inactivated or Recombinant): What You Need to Know    Many vaccine information statements are available in [place]itan and other languages. See www.immunize.org/vis. Hojas de información sobre vacunas están disponibles en español y en muchos otros idiomas. Visite www.immunize.org/vis. 1. Why get vaccinated? Influenza vaccine can prevent influenza (flu). Flu is a contagious disease that spreads around the [place] every year, usually between October and May. Anyone can get the flu, but it is more dangerous for some people.  Infants and young children, people 72 years and older, pregnant people, and people with certain health conditions or a weakened immune system are at greatest risk of flu complications. Pneumonia, bronchitis, sinus infections, and ear infections are examples of flu-related complications. If you have a medical condition, such as heart disease, cancer, or diabetes, flu can make it worse. Flu can cause fever and chills, sore throat, muscle aches, fatigue, cough, headache, and runny or stuffy nose. Some people may have vomiting and diarrhea, though this is more common in children than adults. In an average year, thousands of people in the [facility] die from flu, and many more are hospitalized. Flu vaccine prevents millions of illnesses and flu-related visits to the doctor each year. 2. Influenza vaccines     CDC recommends everyone 6 months and older get vaccinated every flu season. Children 6 months through 6years of age may need 2 doses during a single flu season. Everyone else needs only 1 dose each flu season. It takes about 2 weeks for protection to develop after vaccination. There are many flu viruses, and they are always changing. Each year a new flu vaccine is made to protect against the influenza viruses believed to be likely to cause disease in the upcoming flu season. Even when the vaccine doesnt exactly match these viruses, it may still provide some protection. Influenza vaccine does not cause flu. Influenza vaccine may be given at the same time as other vaccines. 3. Talk with your health care provider    Tell your vaccination provider if the person getting the vaccine:  Has had an allergic reaction after a previous dose of influenza vaccine, or has any severe, life-threatening allergies   Has ever had Guillain-Barré Syndrome (also called GBS)    In some cases, your health care provider may decide to postpone influenza vaccination until a future visit. Influenza vaccine can be administered at any time during pregnancy.  People who are or will be pregnant during influenza season should receive inactivated influenza vaccine. People with minor illnesses, such as a cold, may be vaccinated. People who are moderately or severely ill should usually wait until they recover before getting influenza vaccine. Your health care provider can give you more information. 4. Risks of a vaccine reaction    Soreness, redness, and swelling where the shot is given, fever, muscle aches, and headache can happen after influenza vaccination. There may be a very small increased risk of Guillain-Barré Syndrome (GBS) after inactivated influenza vaccine (the flu shot). [facility] children who get the flu shot along with pneumococcal vaccine (PCV13) and/or DTaP vaccine at the same time might be slightly more likely to have a seizure caused by fever. Tell your health care provider if a child who is getting flu vaccine has ever had a seizure. People sometimes faint after medical procedures, including vaccination. Tell your provider if you feel dizzy or have vision changes or ringing in the ears. As with any medicine, there is a very remote chance of a vaccine causing a severe allergic reaction, other serious injury, or death. 5. What if there is a serious problem? An allergic reaction could occur after the vaccinated person leaves the clinic. If you see signs of a severe allergic reaction (hives, swelling of the face and throat, difficulty breathing, a fast heartbeat, dizziness, or weakness), call 9-1-1 and get the person to the nearest hospital.    For other signs that concern you, call your health care provider. Adverse reactions should be reported to the Vaccine Adverse Event Reporting System (VAERS). Your health care provider will usually file this report, or you can do it yourself. Visit the VAERS website at www.vaers. hhs.gov or call 8-519.116.1476. VAERS is only for reporting reactions, and VAERS staff members do not give medical advice.     6. The National Vaccine Injury Compensation Program    The [employer] [place] Vaccine Injury Compensation Program (VICP) is a federal program that was created to compensate people who may have been injured by certain vaccines. Claims regarding alleged injury or death due to vaccination have a time limit for filing, which may be as short as two years. Visit the VICP website at [facility].gov/vaccinecompensation or call 9-221.734.1117 to learn about the program and about filing a claim. 7. How can I learn more? Ask your health care provider. Call your local or state health department. Visit the website of the Food and Drug Administration (FDA) for vaccine package inserts and additional information at www.fda.gov/vaccines-blood-biologics/vaccines. Contact the Centers for Disease Control and Prevention (CDC): Call 7-245.462.5341 (1-800-CDC-INFO) or  Visit CDCs influenza website at www.cdc.gov/flu. Vaccine Information Statement   Inactivated Influenza Vaccine   8/6/2021  42 DAWNA Andrade [id number]     Department of Health and Human Services  Centers for Disease Control and Prevention    Office Use Only

## 2022-10-15 LAB
25(OH)D3+25(OH)D2 SERPL-MCNC: 13 NG/ML (ref 30–100)
ALBUMIN SERPL-MCNC: 4.5 G/DL (ref 3.9–5)
ALBUMIN/GLOB SERPL: 1.5 {RATIO} (ref 1.2–2.2)
ALP SERPL-CCNC: 86 IU/L (ref 44–121)
ALT SERPL-CCNC: 8 IU/L (ref 0–32)
AST SERPL-CCNC: 12 IU/L (ref 0–40)
BASOPHILS # BLD AUTO: 0 X10E3/UL (ref 0–0.2)
BASOPHILS NFR BLD AUTO: 0 %
BILIRUB SERPL-MCNC: <0.2 MG/DL (ref 0–1.2)
BUN SERPL-MCNC: 7 MG/DL (ref 6–20)
BUN/CREAT SERPL: 10 (ref 9–23)
CALCIUM SERPL-MCNC: 9.8 MG/DL (ref 8.7–10.2)
CHLORIDE SERPL-SCNC: 105 MMOL/L (ref 96–106)
CHOLEST SERPL-MCNC: 196 MG/DL (ref 100–199)
CO2 SERPL-SCNC: 22 MMOL/L (ref 20–29)
CREAT SERPL-MCNC: 0.7 MG/DL (ref 0.57–1)
EGFR: 125 ML/MIN/1.73
EOSINOPHIL # BLD AUTO: 0.1 X10E3/UL (ref 0–0.4)
EOSINOPHIL NFR BLD AUTO: 2 %
ERYTHROCYTE [DISTWIDTH] IN BLOOD BY AUTOMATED COUNT: 14.8 % (ref 11.7–15.4)
EST. AVERAGE GLUCOSE BLD GHB EST-MCNC: 120 MG/DL
GLOBULIN SER CALC-MCNC: 3.1 G/DL (ref 1.5–4.5)
GLUCOSE SERPL-MCNC: 78 MG/DL (ref 70–99)
HBA1C MFR BLD: 5.8 % (ref 4.8–5.6)
HCT VFR BLD AUTO: 36.7 % (ref 34–46.6)
HDLC SERPL-MCNC: 51 MG/DL
HGB BLD-MCNC: 12 G/DL (ref 11.1–15.9)
IMM GRANULOCYTES # BLD AUTO: 0 X10E3/UL (ref 0–0.1)
IMM GRANULOCYTES NFR BLD AUTO: 0 %
IMP & REVIEW OF LAB RESULTS: NORMAL
LDLC SERPL CALC-MCNC: 134 MG/DL (ref 0–99)
LYMPHOCYTES # BLD AUTO: 3.1 X10E3/UL (ref 0.7–3.1)
LYMPHOCYTES NFR BLD AUTO: 44 %
MCH RBC QN AUTO: 28.2 PG (ref 26.6–33)
MCHC RBC AUTO-ENTMCNC: 32.7 G/DL (ref 31.5–35.7)
MCV RBC AUTO: 86 FL (ref 79–97)
MONOCYTES # BLD AUTO: 0.5 X10E3/UL (ref 0.1–0.9)
MONOCYTES NFR BLD AUTO: 7 %
NEUTROPHILS # BLD AUTO: 3.3 X10E3/UL (ref 1.4–7)
NEUTROPHILS NFR BLD AUTO: 47 %
PLATELET # BLD AUTO: 411 X10E3/UL (ref 150–450)
POTASSIUM SERPL-SCNC: 5 MMOL/L (ref 3.5–5.2)
PROT SERPL-MCNC: 7.6 G/DL (ref 6–8.5)
RBC # BLD AUTO: 4.25 X10E6/UL (ref 3.77–5.28)
SODIUM SERPL-SCNC: 142 MMOL/L (ref 134–144)
TRIGL SERPL-MCNC: 59 MG/DL (ref 0–149)
TSH SERPL DL<=0.005 MIU/L-ACNC: 1.2 UIU/ML (ref 0.45–4.5)
VLDLC SERPL CALC-MCNC: 11 MG/DL (ref 5–40)
WBC # BLD AUTO: 7 X10E3/UL (ref 3.4–10.8)

## 2022-10-16 DIAGNOSIS — E55.9 VITAMIN D DEFICIENCY: Primary | ICD-10-CM

## 2022-10-16 RX ORDER — ERGOCALCIFEROL 1.25 MG/1
50000 CAPSULE ORAL
Qty: 12 CAPSULE | Refills: 0 | Status: SHIPPED | OUTPATIENT
Start: 2022-10-16

## 2022-10-16 NOTE — PROGRESS NOTES
The following message was sent to pt via [employer] portal in reference to lab results:    Hi Frederick Darian,   Attached are the results of your most recent lab work. I have the following recommendations:    1. Your CBC which looks at your white blood cells, red blood cells, and hemoglobin came back looking normal. No sign of infection or anemia. 2. Your metabolic panel which looks at your blood glucose, liver function, and kidney function looks perfect. 3. Your cholesterol is a little elevated. Your LDL or \"bad cholesterol\" is high. I urge you to work on making some diet and lifestyle changes to improve this. The BEST way to lower cholesterol is to follow a strict diet that is low fat combined with regular exercise. Here are a few tips on how to do this:  - Avoid foods that are high in saturated fats (especially fried foods)  - Replace butter with margarine  - Eat lots of fresh fruits and vegetables  - Choose fish, chicken, and turkey as your serving of meat  - Try to avoid too many processed foods  - Choose non fat milk  - Use whole wheat bread  You should also try and do 30 minutes of aerobic exercise most days of the week. All of these will contribute to lowering your cholesterol and decrease your risk of heart disease. 4. Your TSH which screens for thyroid disease came back normal. This means you do not have hyper or hypothyroidism. 5. Your hemoglobin a1c shows that your prediabetes is well controlled. Try to continue to follow a low carb to prevent this from worsening and requiring a new daily medication to prevent diabetes. 6. Your vitamin D level is very low or deficient. I would like you to take a once weekly vitamin D supplement over the next 3 months to replete this. Once you have finished that, start taking an over the counter calcium and vitamin D supplement that contains 2,000 IUs of vitamin D daily to prevent your levels from dropping again.  Having normal vitamin D levels is important because you need vitamin D to absorb calcium into the bone and having low vitamin D levels increases your risk for osteoporosis down the road. This may help improve your symptoms of depression too! Please let me know if you have any questions or concerns regarding these results.    Elise Pineda, LUISP-C

## 2022-12-27 ENCOUNTER — TELEPHONE (OUTPATIENT)
Dept: FAMILY MEDICINE CLINIC | Age: 22
End: 2022-12-27

## 2022-12-27 NOTE — TELEPHONE ENCOUNTER
----- Message from Marybelbry Saba sent at 12/27/2022 10:21 AM EST -----  Subject: Referral Request    Reason for referral request? Routine lab work   Provider patient wants to be referred to(if known):     Provider Phone Number(if known): Additional Information for Provider?  Pls give the pt a call to schedule   the lab appt  ---------------------------------------------------------------------------  --------------  [id number] [facility]    [phone]; OK to leave message on voicemail  ---------------------------------------------------------------------------  --------------

## 2022-12-30 ENCOUNTER — HOSPITAL ENCOUNTER (EMERGENCY)
Age: 22
Discharge: HOME | End: 2022-12-30
Attending: EMERGENCY MEDICINE
Payer: MEDICAID

## 2022-12-30 VITALS
TEMPERATURE: 98.2 F | HEART RATE: 73 BPM | WEIGHT: 182 LBS | HEIGHT: 68 IN | BODY MASS INDEX: 27.58 KG/M2 | RESPIRATION RATE: 16 BRPM | OXYGEN SATURATION: 100 %

## 2022-12-30 DIAGNOSIS — K02.9 PAIN DUE TO DENTAL CARIES: Primary | ICD-10-CM

## 2022-12-30 PROCEDURE — 74011250637 HC RX REV CODE- 250/637: Performed by: EMERGENCY MEDICINE

## 2022-12-30 PROCEDURE — 99283 EMERGENCY DEPT VISIT LOW MDM: CPT

## 2022-12-30 RX ORDER — AMOXICILLIN 500 MG/1
500 TABLET, FILM COATED ORAL 3 TIMES DAILY
Qty: 21 TABLET | Refills: 0 | Status: SHIPPED | OUTPATIENT
Start: 2022-12-30 | End: 2023-01-06

## 2022-12-30 RX ORDER — IBUPROFEN 800 MG/1
800 TABLET ORAL ONCE
Status: COMPLETED | OUTPATIENT
Start: 2022-12-30 | End: 2022-12-30

## 2022-12-30 RX ADMIN — IBUPROFEN 800 MG: 800 TABLET, FILM COATED ORAL at 23:48

## 2022-12-31 NOTE — ED NOTES
Patient was discharged at 2336 . Patient verbalized understanding of all discharge instructions. Patient alert and oriented, no acute distress when leaving ED. Patient ambulatory when leaving ED.

## 2022-12-31 NOTE — ED TRIAGE NOTES
C/o toothache x 1 week to left side of mouth. Have an appointment for dentist Jan 19th.  Took tylenol and uses oragel as well minimal to no effect

## 2022-12-31 NOTE — ED PROVIDER NOTES
51-year-old female with dental pain. She has extensive decay and fracturing of her left lower molars. No nausea or vomiting or fever. She knows she needs to see a dentist ASAP is requesting antibiotics. The history is provided by the patient. Dental Pain   This is a recurrent problem. The current episode started more than 1 week ago. The problem occurs constantly. The problem has not changed since onset. The pain is located in the left lower mouth. The pain is mild. There was no vomiting, no nausea, no fever, no chest pain, no shortness of breath, no headaches and no drainage. She has tried nothing for the symptoms. The patient has no cardiac history.      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    Seasonal allergies        Past Surgical History:   Procedure Laterality Date    HX HERNIA REPAIR  2003         Family History:   Problem Relation Age of Onset    Hypertension Mother     Elevated Lipids Mother     Hypertension Father     Elevated Lipids Father     Elevated Lipids Maternal Grandfather     Hypertension Maternal Grandfather        Social History     Socioeconomic History    Marital status: SINGLE     Spouse name: Not on file    Number of children: Not on file    Years of education: Not on file    Highest education level: Not on file   Occupational History    Not on file   Tobacco Use    Smoking status: Never    Smokeless tobacco: Never   Vaping Use    Vaping Use: Never used   Substance and Sexual Activity    Alcohol use: No    Drug use: No    Sexual activity: Yes     Partners: Male     Birth control/protection: Condom   Other Topics Concern    Not on file   Social History Narrative    Not on file     Social Determinants of Health     Financial Resource Strain: Not on file   Food Insecurity: Not on file   Transportation Needs: Not on file   Physical Activity: Not on file   Stress: Not on file   Social Connections: Not on file   Intimate Partner Violence: Not on file   Housing Stability: Not on file         ALLERGIES: Patient has no known allergies. Review of Systems   Constitutional:  Negative for chills and fever. HENT:  Positive for dental problem. Negative for congestion, rhinorrhea, sneezing and sore throat. Respiratory:  Negative for shortness of breath. Cardiovascular:  Negative for chest pain. Gastrointestinal:  Negative for abdominal pain, nausea and vomiting. Musculoskeletal:  Negative for back pain, myalgias and neck stiffness. Skin:  Negative for rash. Neurological:  Negative for dizziness, weakness and headaches. All other systems reviewed and are negative. Vitals:    12/30/22 2233   Pulse: 73   Resp: 16   Temp: 98.2 °F (36.8 °C)   SpO2: 100%   Weight: 82.6 kg (182 lb)   Height: 5' 8\" (1.727 m)            Physical Exam  Vitals and nursing note reviewed. Constitutional:       Appearance: Normal appearance. She is well-developed. HENT:      Head: Normocephalic and atraumatic. Mouth/Throat:      Comments: Extensive dental decay with fractures of the molars. Eyes:      Conjunctiva/sclera: Conjunctivae normal.   Cardiovascular:      Rate and Rhythm: Normal rate and regular rhythm. Pulses: Normal pulses. Heart sounds: Normal heart sounds, S1 normal and S2 normal.   Pulmonary:      Effort: Pulmonary effort is normal. No respiratory distress. Breath sounds: Normal breath sounds. No wheezing. Abdominal:      General: Bowel sounds are normal. There is no distension. Palpations: Abdomen is soft. Tenderness: There is no abdominal tenderness. There is no rebound. Musculoskeletal:         General: Normal range of motion. Cervical back: Full passive range of motion without pain, normal range of motion and neck supple. Skin:     General: Skin is warm and dry. Findings: No rash. Neurological:      Mental Status: She is alert and oriented to person, place, and time.    Psychiatric:         Speech: Speech normal.         Behavior: Behavior normal.         Thought Content:  Thought content normal.         Judgment: Judgment normal.        MDM         Procedures

## 2023-01-06 ENCOUNTER — OFFICE VISIT (OUTPATIENT)
Dept: FAMILY MEDICINE CLINIC | Age: 23
End: 2023-01-06
Payer: MEDICAID

## 2023-01-06 VITALS
BODY MASS INDEX: 30.16 KG/M2 | WEIGHT: 199 LBS | SYSTOLIC BLOOD PRESSURE: 102 MMHG | HEART RATE: 69 BPM | HEIGHT: 68 IN | OXYGEN SATURATION: 100 % | TEMPERATURE: 99.4 F | DIASTOLIC BLOOD PRESSURE: 70 MMHG | RESPIRATION RATE: 18 BRPM

## 2023-01-06 DIAGNOSIS — E78.2 MIXED HYPERLIPIDEMIA: ICD-10-CM

## 2023-01-06 DIAGNOSIS — R73.03 PREDIABETES: ICD-10-CM

## 2023-01-06 DIAGNOSIS — E55.9 VITAMIN D DEFICIENCY: Primary | ICD-10-CM

## 2023-01-06 RX ORDER — FLUCONAZOLE 150 MG/1
150 TABLET ORAL DAILY
Qty: 1 TABLET | Refills: 0 | Status: SHIPPED | OUTPATIENT
Start: 2023-01-06 | End: 2023-01-07

## 2023-01-06 NOTE — PROGRESS NOTES
Chief Complaint   Patient presents with    Physical    Labs       Patient in office today for cpe and fasting labs-please include vit d labs. Pt receives gyn care with  in April 2023. Had fasting labs done in October. Cholesterol was a little elevated and her vitamin D was very low. Completed weekly vitamin D prescription. Not currently taking an OTC dose of vitamin D. Currently taking ashwaghanda daily which has been helpful for mood and melatonin in the evening. Has a new job and is very happy. Denies any other concerns at this time. Chief Complaint   Patient presents with    Physical    Labs     she is a 25y.o. year old female who presents for evalution. Reviewed PmHx, RxHx, FmHx, SocHx, AllgHx and updated and dated in the chart. Review of Systems - negative except as listed above in the HPI    Current Outpatient Medications   Medication Instructions    butalbital-acetaminophen-caffeine (FIORICET, ESGIC) -40 mg per tablet     cyclobenzaprine (FLEXERIL) 5-10 mg, Oral, 3 TIMES DAILY AS NEEDED    diphenhydrAMINE (BENADRYL) 25 mg, Oral, EVERY 6 HOURS AS NEEDED    ibuprofen (MOTRIN) 600 mg tablet No dose, route, or frequency recorded. ondansetron (ZOFRAN ODT) 4 mg disintegrating tablet LET 1 TAB DISSOVLE ON TONGUE EVEYR 8 HRS AS NEEDED FOR NAUSEA/VOMITTING    topiramate (TOPAMAX) 50 mg, Oral, EVERY BEDTIME,          Objective:     Vitals:    01/06/23 1008   BP: 102/70   Pulse: 69   Resp: 18   Temp: 99.4 °F (37.4 °C)   TempSrc: Oral   SpO2: 100%   Weight: 199 lb (90.3 kg)   Height: 5' 8\" (1.727 m)     Physical Examination: General appearance - alert, well appearing, and in no distress  Mental status - normal mood, behavior  Chest - clear to auscultation, no wheezes, rales or rhonchi, symmetric air entry  Heart - normal rate, regular rhythm, normal S1, S2    Assessment/ Plan:   Diagnoses and all orders for this visit:    1.  Vitamin D deficiency  -     METABOLIC PANEL, COMPREHENSIVE; Future  -     VITAMIN D, 25 HYDROXY; Future  Will notify results and deviate plan based on findings. Completed the weekly high dose vitamin D, not currently on any OTC for maintenance. 2. Prediabetes  Enc pt to continue with motivation to work on diet and lifestyle to decrease risk of prediabetes becoming a type 2 diabetes. Will need labs again in 3-4 months. 3. Mixed hyperlipidemia  -     LIPID PANEL; Future  Family history of high cholesterol. Has not made any diet changes to address this. Discussed that if the cholesterol continues to creep up, she will need to start a daily cholesterol lowering medication to decrease risk for heart attack and stroke. I have discussed the diagnosis with the patient and the intended plan as seen in the above orders. The patient has received an after-visit summary and questions were answered concerning future plans. Medication Side Effects and Warnings were discussed with patient: yes  Patient Labs were reviewed and or requested: yes  Patient Past Records were reviewed and or requested  yes  Patient / Caregiver Understanding of treatment plan was verbalized during office visit YES    MARCELO Hedrick    There are no Patient Instructions on file for this visit.

## 2023-01-06 NOTE — PROGRESS NOTES
Chief Complaint   Patient presents with    Physical    Labs       1. Patient in office today for cpe and fasting labs-please include vit d labs. Pt receives gyn care with  in April 2023.      1. Have you been to the ER, urgent care clinic since your last visit? Hospitalized since your last visit? No    2. Have you seen or consulted any other health care providers outside of the [address] [place] [address] since your last visit? Include any pap smears or colon screening.  No

## 2023-01-07 LAB
25(OH)D3+25(OH)D2 SERPL-MCNC: 19.6 NG/ML (ref 30–100)
ALBUMIN SERPL-MCNC: 4.4 G/DL (ref 3.9–5)
ALBUMIN/GLOB SERPL: 1.3 {RATIO} (ref 1.2–2.2)
ALP SERPL-CCNC: 81 IU/L (ref 44–121)
ALT SERPL-CCNC: 8 IU/L (ref 0–32)
AST SERPL-CCNC: 14 IU/L (ref 0–40)
BILIRUB SERPL-MCNC: <0.2 MG/DL (ref 0–1.2)
BUN SERPL-MCNC: 5 MG/DL (ref 6–20)
BUN/CREAT SERPL: 8 (ref 9–23)
CALCIUM SERPL-MCNC: 9.5 MG/DL (ref 8.7–10.2)
CHLORIDE SERPL-SCNC: 107 MMOL/L (ref 96–106)
CHOLEST SERPL-MCNC: 176 MG/DL (ref 100–199)
CO2 SERPL-SCNC: 24 MMOL/L (ref 20–29)
CREAT SERPL-MCNC: 0.63 MG/DL (ref 0.57–1)
EGFR: 129 ML/MIN/1.73
GLOBULIN SER CALC-MCNC: 3.3 G/DL (ref 1.5–4.5)
GLUCOSE SERPL-MCNC: 78 MG/DL (ref 70–99)
HDLC SERPL-MCNC: 47 MG/DL
IMP & REVIEW OF LAB RESULTS: NORMAL
LDLC SERPL CALC-MCNC: 115 MG/DL (ref 0–99)
POTASSIUM SERPL-SCNC: 4.7 MMOL/L (ref 3.5–5.2)
PROT SERPL-MCNC: 7.7 G/DL (ref 6–8.5)
SODIUM SERPL-SCNC: 142 MMOL/L (ref 134–144)
TRIGL SERPL-MCNC: 75 MG/DL (ref 0–149)
VLDLC SERPL CALC-MCNC: 14 MG/DL (ref 5–40)

## 2023-01-09 NOTE — PROGRESS NOTES
The following message was sent to pt via [employer] portal in reference to lab results:    Good morning Ivelisse Espinosa,   Your labs look better! Your cholesterol has improved which is excellent. Your vitamin D is better but still not at goal so I recommend you start taking 2000 units of vitamin D daily for maintenance. You can purchase that over the counter. Your other labs look good. Keep up the good work, your hard work is paying off! Lets recheck these labs in 6 months.  Please do not hesitate to call me or schedule an appointment to be seen if you need anything else in the meantime :)  MARCELO Garcia

## 2023-02-14 ENCOUNTER — APPOINTMENT (OUTPATIENT)
Dept: GENERAL RADIOLOGY | Age: 23
End: 2023-02-14
Attending: PHYSICIAN ASSISTANT
Payer: MEDICAID

## 2023-02-14 ENCOUNTER — HOSPITAL ENCOUNTER (EMERGENCY)
Age: 23
Discharge: HOME | End: 2023-02-14
Attending: EMERGENCY MEDICINE
Payer: MEDICAID

## 2023-02-14 VITALS
RESPIRATION RATE: 16 BRPM | DIASTOLIC BLOOD PRESSURE: 80 MMHG | BODY MASS INDEX: 27.74 KG/M2 | TEMPERATURE: 98.9 F | HEART RATE: 64 BPM | SYSTOLIC BLOOD PRESSURE: 135 MMHG | WEIGHT: 183 LBS | HEIGHT: 68 IN | OXYGEN SATURATION: 100 %

## 2023-02-14 DIAGNOSIS — R10.13 DYSPEPSIA: ICD-10-CM

## 2023-02-14 DIAGNOSIS — R07.89 CHEST WALL PAIN: Primary | ICD-10-CM

## 2023-02-14 LAB — HCG UR QL: NEGATIVE

## 2023-02-14 PROCEDURE — 71046 X-RAY EXAM CHEST 2 VIEWS: CPT

## 2023-02-14 PROCEDURE — 74011250637 HC RX REV CODE- 250/637: Performed by: PHYSICIAN ASSISTANT

## 2023-02-14 PROCEDURE — 99284 EMERGENCY DEPT VISIT MOD MDM: CPT

## 2023-02-14 RX ORDER — AMOXICILLIN 500 MG/1
CAPSULE ORAL
COMMUNITY
Start: 2023-02-04

## 2023-02-14 RX ORDER — FAMOTIDINE 20 MG/1
20 TABLET, FILM COATED ORAL 2 TIMES DAILY
Qty: 14 TABLET | Refills: 0 | Status: SHIPPED | OUTPATIENT
Start: 2023-02-14

## 2023-02-14 RX ADMIN — ALUMINUM HYDROXIDE AND MAGNESIUM HYDROXIDE 30 ML: 200; 200 SUSPENSION ORAL at 14:01

## 2023-02-14 NOTE — ED PROVIDER NOTES
66-year-old female with PMH of migraine, emotional disturbance, seasonal allergies who presents ambulatory with mother at her request for evaluation of dull intermittent substernal chest pain worse after eating for the past few days. She states what she is feeling in her chest is not pain, more of a discomfort. Prior to ER arrival today she ate fast food and drink a large soda. She does state after eating the discomfort in the chest increases. She tried to pass gas but was unsuccessful and felt an increase in discomfort in her chest prompting the ER visit. She denies F/C, shortness of breath, diaphoresis, neck pain, arm pain, back pain, urinary symptoms, abdominal pain, flank pain, numbness, tingling, weakness, N/V/D.    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    Seasonal allergies        Past Surgical History:   Procedure Laterality Date    HX HERNIA REPAIR  2003         Family History:   Problem Relation Age of Onset    Hypertension Mother     Elevated Lipids Mother     Hypertension Father     Elevated Lipids Father     Elevated Lipids Maternal Grandfather     Hypertension Maternal Grandfather        Social History     Socioeconomic History    Marital status: SINGLE     Spouse name: Not on file    Number of children: Not on file    Years of education: Not on file    Highest education level: Not on file   Occupational History    Not on file   Tobacco Use    Smoking status: Never    Smokeless tobacco: Never   Vaping Use    Vaping Use: Never used   Substance and Sexual Activity    Alcohol use: No    Drug use: No    Sexual activity: Yes     Partners: Male     Birth control/protection: Condom   Other Topics Concern    Not on file   Social History Narrative    Not on file     Social Determinants of Health     Financial Resource Strain: Not on file   Food Insecurity: Not on file   Transportation Needs: Not on file   Physical Activity: Not on file   Stress: Not on file   Social Connections: Not on file   Intimate Partner Violence: Not on file   Housing Stability: Not on file         ALLERGIES: Patient has no known allergies. Review of Systems   Constitutional: Negative. Negative for activity change, chills, fatigue and unexpected weight change. HENT:  Negative for trouble swallowing. Respiratory:  Negative for cough, chest tightness, shortness of breath and wheezing. Cardiovascular:  Positive for chest pain. Negative for palpitations. Gastrointestinal: Negative. Negative for abdominal pain, diarrhea, nausea and vomiting. Genitourinary: Negative. Negative for dysuria, flank pain, frequency and hematuria. Musculoskeletal: Negative. Negative for arthralgias, back pain, neck pain and neck stiffness. Skin: Negative. Negative for color change and rash. Neurological: Negative. Negative for dizziness, numbness and headaches. All other systems reviewed and are negative. Vitals:    02/14/23 1308   BP: 135/80   Pulse: 64   Resp: 16   Temp: 98.9 °F (37.2 °C)   SpO2: 100%   Weight: 83 kg (183 lb)   Height: 5' 8\" (1.727 m)            Physical Exam  Vitals and nursing note reviewed. Constitutional:       General: She is not in acute distress. Appearance: Normal appearance. She is well-developed. She is not toxic-appearing or diaphoretic. HENT:      Head: Normocephalic and atraumatic. Right Ear: Tympanic membrane normal.      Left Ear: Tympanic membrane normal.      Nose: Nose normal.      Mouth/Throat:      Mouth: Mucous membranes are moist.   Eyes:      General:         Right eye: No discharge. Left eye: No discharge. Conjunctiva/sclera: Conjunctivae normal.   Neck:      Trachea: No tracheal tenderness. Cardiovascular:      Rate and Rhythm: Normal rate and regular rhythm. Pulses: Normal pulses. Heart sounds: Normal heart sounds.  No murmur heard. No friction rub. No gallop. Pulmonary:      Effort: Pulmonary effort is normal. No respiratory distress. Breath sounds: Normal breath sounds. No wheezing or rales. Chest:      Chest wall: Tenderness present. Abdominal:      General: Bowel sounds are normal. There is no distension. Palpations: Abdomen is soft. There is no mass. Tenderness: There is no abdominal tenderness. There is no right CVA tenderness, left CVA tenderness, guarding or rebound. Hernia: No hernia is present. Musculoskeletal:         General: No tenderness. Normal range of motion. Cervical back: Full passive range of motion without pain and normal range of motion. Skin:     General: Skin is warm and dry. Capillary Refill: Capillary refill takes less than 2 seconds. Findings: No abrasion, erythema or rash. Neurological:      General: No focal deficit present. Mental Status: She is alert and oriented to person, place, and time. Cranial Nerves: No cranial nerve deficit. Sensory: No sensory deficit. Coordination: Coordination normal.   Psychiatric:         Speech: Speech normal.         Behavior: Behavior normal.        Medical Decision Making    DDx: Costochondritis, arrhythmia, GERD, esophageal spasm    Amount and/or Complexity of Data Reviewed  Labs: ordered. Radiology: ordered. ECG/medicine tests: ordered. Risk  OTC drugs. ED Course as of 02/14/23 1544   Tue Feb 14, 2023   1309 ED EKG interpretation:  Rhythm: normal sinus rhythm. Rate (approx.): 63.  Axis: normal.  ST segment:  No concerning ST elevations or depressions. This EKG was independently interpreted by Steven Neves MD,ED Provider. [JM]      ED Course User Index  [JM] Sruthi De La Cruz MD       Procedures  Patient has been reassessed and feeling better. No family or personal cardiac history. Discussed diet changes she drink consumes a lot of caffeine and processed/fast food items.   Will start on Pepcid and recommend follow-up with PCP or GI. Strict return precautions were discussed. DISCHARGE NOTE:  3:44 PM  The patient has been re-evaluated and feeling much better and are stable for discharge. All available radiology and laboratory results have been reviewed with patient and/or available family. Patient and/or family verbally conveyed their understanding and agreement of the patient's signs, symptoms, diagnosis, treatment and prognosis and additionally agree to follow-up as recommended in the discharge instructions or to return to the Emergency Department should their condition change or worsen prior to their follow-up appointment. All questions have been answered and patient and/or available family express understanding. LABORATORY RESULTS:  Recent Results (from the past 24 hour(s))   HCG URINE, QL. - POC    Collection Time: 02/14/23  2:42 PM   Result Value Ref Range    Pregnancy test,urine (POC) Negative NEG         IMAGING RESULTS:  XR CHEST PA LAT    Result Date: 2/14/2023  Normal PA and lateral chest views. MEDICATIONS GIVEN:  Medications   aluminum-magnesium hydroxide (MAALOX) oral suspension 30 mL (30 mL Oral Given 2/14/23 1401)       IMPRESSION:  1. Chest wall pain    2. Dyspepsia        PLAN:  Follow-up Information       Follow up With Specialties Details Why Contact Info    Blanca Hall MD [facility] Medicine Schedule an appointment as soon as possible for a visit   [address] [place] [address] [address]      Danni Christianson MD Cardiovascular Disease Physician, Cardio Vascular Surgery Schedule an appointment as soon as possible for a visit  if chest pain continues. [address] [facility]      Nati Gilliam MD Gastroenterology Schedule an appointment as soon as possible for a visit  GI specialist if your stomach acid problem continues.  [address]  [address]  [phone] Discharge Medication List as of 2/14/2023  2:54 PM        START taking these medications    Details   famotidine (Pepcid) 20 mg tablet Take 1 Tablet by mouth two (2) times a day., Print, Disp-14 Tablet, R-0           CONTINUE these medications which have NOT CHANGED    Details   amoxicillin (AMOXIL) 500 mg capsule TAKE 1 CAPSULE BY MOUTH THREE TIMES A DAY TIL GONE, Historical Med      ondansetron (ZOFRAN ODT) 4 mg disintegrating tablet LET 1 TAB DISSOVLE ON TONGUE EVEYR 8 HRS AS NEEDED FOR NAUSEA/VOMITTING, Normal, Disp-30 Tablet, R-2      topiramate (TOPAMAX) 50 mg tablet Take 50 mg by mouth nightly.  , Historical Med      butalbital-acetaminophen-caffeine (FIORICET, ESGIC) -40 mg per tablet Historical Med      cyclobenzaprine (FLEXERIL) 5 mg tablet Take 1-2 Tablets by mouth three (3) times daily as needed for Muscle Spasm(s). , Normal, Disp-60 Tablet, R-0      ibuprofen (MOTRIN) 600 mg tablet Historical Med      diphenhydrAMINE (BENADRYL) 25 mg capsule Take 25 mg by mouth every six (6) hours as needed., Historical Med

## 2023-02-14 NOTE — ED NOTES
Patient (s)  given copy of dc instructions and one paper script(s) and no electronic scripts. Patient (s)  verbalized understanding of instructions and script (s). Patient given a current medication reconciliation form and verbalized understanding of their medications. Patient (s) verbalized understanding of the importance of discussing medications with  his or her physician or clinic they will be following up with. Patient alert and oriented and in no acute distress. Patient offered wheelchair from treatment area to hospital entrance, patient denies wheelchair.

## 2023-02-14 NOTE — ED TRIAGE NOTES
Pt arrives in the ED ambulatory with complaints of chest pain x few days worse yesterday. Pt denies SOB.

## 2023-02-15 LAB
ATRIAL RATE: 63 BPM
CALCULATED P AXIS, ECG09: 23 DEGREES
CALCULATED R AXIS, ECG10: 22 DEGREES
CALCULATED T AXIS, ECG11: 17 DEGREES
DIAGNOSIS, 93000: NORMAL
P-R INTERVAL, ECG05: 154 MS
Q-T INTERVAL, ECG07: 434 MS
QRS DURATION, ECG06: 88 MS
QTC CALCULATION (BEZET), ECG08: 444 MS
VENTRICULAR RATE, ECG03: 63 BPM

## 2023-03-28 ENCOUNTER — VIRTUAL VISIT (OUTPATIENT)
Dept: FAMILY MEDICINE CLINIC | Age: 23
End: 2023-03-28
Payer: MEDICAID

## 2023-03-28 DIAGNOSIS — G43.009 MIGRAINE WITHOUT AURA AND WITHOUT STATUS MIGRAINOSUS, NOT INTRACTABLE: Primary | ICD-10-CM

## 2023-03-28 DIAGNOSIS — M25.561 ACUTE PAIN OF BOTH KNEES: ICD-10-CM

## 2023-03-28 DIAGNOSIS — M25.562 ACUTE PAIN OF BOTH KNEES: ICD-10-CM

## 2023-03-28 PROCEDURE — 99214 OFFICE O/P EST MOD 30 MIN: CPT | Performed by: NURSE PRACTITIONER

## 2023-03-28 RX ORDER — TOPIRAMATE 50 MG/1
50 TABLET, FILM COATED ORAL
Qty: 90 TABLET | Refills: 1 | Status: SHIPPED | OUTPATIENT
Start: 2023-03-28

## 2023-03-28 RX ORDER — NAPROXEN 500 MG/1
500 TABLET ORAL 2 TIMES DAILY WITH MEALS
Qty: 20 TABLET | Refills: 0 | Status: SHIPPED | OUTPATIENT
Start: 2023-03-28 | End: 2023-04-07

## 2023-03-28 NOTE — PROGRESS NOTES
Jessica Osei is a 25 y.o. female , id x 2(name and ). Reviewed questionnaires, and  medications. Chief Complaint   Patient presents with    Knee Pain     Experiencing pain in both knees x few months. Pain is constant in (L) knee and comes and goes in the (R). Medication Refill     Topamax. 1. Have you been to the ER, urgent care clinic since your last visit? Hospitalized since your last visit? Yes [facility] ED    2. Have you seen or consulted any other health care providers outside of the [address] [place] [address] since your last visit? Include any pap smears or colon screening.  No

## 2023-03-28 NOTE — PROGRESS NOTES
Debi Pichardo is a 25 y.o. female who was seen by synchronous (real-time) audio-video technology on 3/28/2023 for Knee Pain (Experiencing pain in both knees x few months. Martina Davila is constant in (L) knee and comes and goes in the (R). ) and Medication Refill (Topamax. /)        Assessment & Plan:   Diagnoses and all orders for this visit:    1. Migraine without aura and without status migrainosus, not intractable  Worsening off of medication  Restart topiramate  -     topiramate (TOPAMAX) 50 mg tablet; Take 1 Tablet by mouth nightly. 2. Acute pain of both knees  Add scheduled nsaid for 7-10 days, with food  Follow up by phone or mychart if symptoms persist, favor XR/PT if not improving at that point  -     naproxen (NAPROSYN) 500 mg tablet; Take 1 Tablet by mouth two (2) times daily (with meals) for 10 days. Follow-up and Dispositions    Return in about 6 months (around 9/28/2023), or if symptoms worsen or fail to improve. I have discussed the diagnosis with the patient and the intended plan as seen in the above orders, and questions were answered concerning future plans. Patient conveyed understanding of the plan at the time of the visit. Subjective:     Presents for evaluation of knee pain, follow up of headaches. C/o bilateral knee pain, L > R, for the past month. L knee pain is fairly constant, R intermittent. Describes as aching. No swelling, redness or bruising. No locking. Sometimes feels like it is going to give out, she sits down and waits for feeling to fall. .  Has popped a few times but it was not painful. Has tried icy hot with modest/brief relief. Has tried no other medications or treatments. Can recall no injury or trauma to affected area. Reports increased frequency of migraines, has run out of topirimate, noted worsening of symptoms when this happened.  She was previously tolerating the medication well and felt it was effective, wants to restartn      Prior to Admission medications    Medication Sig Start Date End Date Taking? Authorizing Provider   topiramate (TOPAMAX) 50 mg tablet Take 1 Tablet by mouth nightly. 3/28/23  Yes León Momin NP   naproxen (NAPROSYN) 500 mg tablet Take 1 Tablet by mouth two (2) times daily (with meals) for 10 days. 3/28/23 4/7/23 Yes Roma Momin NP   ondansetron (ZOFRAN ODT) 4 mg disintegrating tablet LET 1 TAB DISSOVLE ON TONGUE EVEYR 8 HRS AS NEEDED FOR NAUSEA/VOMITTING 10/14/22  Yes Osvaldo Cadet NP   butalbital-acetaminophen-caffeine (FIORICET, ESGIC) -40 mg per tablet  6/27/22  Yes Provider, Historical   diphenhydrAMINE (BENADRYL) 25 mg capsule Take 25 mg by mouth every six (6) hours as needed. Yes Provider, Historical   amoxicillin (AMOXIL) 500 mg capsule TAKE 1 CAPSULE BY MOUTH THREE TIMES A DAY TIL GONE  Patient not taking: Reported on 3/28/2023 2/4/23 3/28/23  Other, MD Brittanie   famotidine (Pepcid) 20 mg tablet Take 1 Tablet by mouth two (2) times a day. Patient not taking: Reported on 3/28/2023 2/14/23 3/28/23  Mark Packer PA-C   topiramate (TOPAMAX) 50 mg tablet Take 50 mg by mouth nightly. Patient not taking: Reported on 3/28/2023 6/27/22 3/28/23  Provider, Historical   cyclobenzaprine (FLEXERIL) 5 mg tablet Take 1-2 Tablets by mouth three (3) times daily as needed for Muscle Spasm(s).   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    Dysmenorrhea in adolescent N94.6    Prediabetes R73.03    Irregular menses N92.6    Tension headache G44.209    Chest wall pain R07.89    Anxiety F41.9    Psychophysiological insomnia F51.04    Attention deficit R41.840    Vitamin D deficiency E55.9     No Known Allergies  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    Seasonal allergies      Past Surgical History:   Procedure Laterality Date    HX HERNIA REPAIR  2003     Family History   Problem Relation Age of Onset    Hypertension Mother     Elevated Lipids Mother     Hypertension Father     Elevated Lipids Father     Elevated Lipids Maternal Grandfather     Hypertension Maternal Grandfather      Social History     Tobacco Use    Smoking status: Never    Smokeless tobacco: Never   Substance Use Topics    Alcohol use: No       Review of Systems   Constitutional: Negative. HENT: Negative. Eyes: Negative. Respiratory: Negative. Cardiovascular: Negative. Gastrointestinal: Negative. Genitourinary: Negative. Musculoskeletal:  Positive for joint pain. Skin: Negative. Neurological:  Positive for headaches. Endo/Heme/Allergies: Negative. Psychiatric/Behavioral: Negative. Objective:     Patient-Reported Vitals 5/23/2022   Patient-Reported LMP May 5, 2022      General: alert, cooperative, no distress   Mental  status: normal mood, behavior, speech, dress, motor activity, and thought processes, able to follow commands   HENT: NCAT   Neck: no visualized mass   Resp: no respiratory distress   Neuro: no gross deficits   Skin: no discoloration or lesions of concern on visible areas   Psychiatric: normal affect, consistent with stated mood, no evidence of hallucinations     Additional exam findings:   none    We discussed the expected course, resolution and complications of the diagnosis(es) in detail. Medication risks, benefits, costs, interactions, and alternatives were discussed as indicated. I advised her to contact the office if her condition worsens, changes or fails to improve as anticipated. She expressed understanding with the diagnosis(es) and plan. Lebron Haywood, was evaluated through a synchronous (real-time) audio-video encounter.  The patient (or guardian if applicable) is aware that this is a billable service, which includes applicable co-pays. This Virtual Visit was conducted with patient's (and/or legal guardian's) consent. The visit was conducted pursuant to the emergency declaration under the [address] [place] [address] and the [employer] and [employer] General Act. Patient identification was verified, and a caregiver was present when appropriate.   The patient was located at: Home: [address] [facility] [facility] [address] . [facility] [id number]  The provider was located at: Home: [address] [place] [address]  3/28/2023

## 2023-05-08 ENCOUNTER — TELEMEDICINE (OUTPATIENT)
Age: 23
End: 2023-05-08
Payer: COMMERCIAL

## 2023-05-08 DIAGNOSIS — M25.561 CHRONIC PAIN OF RIGHT KNEE: Primary | ICD-10-CM

## 2023-05-08 DIAGNOSIS — G89.29 CHRONIC PAIN OF LEFT KNEE: ICD-10-CM

## 2023-05-08 DIAGNOSIS — G89.29 CHRONIC LOW BACK PAIN WITHOUT SCIATICA, UNSPECIFIED BACK PAIN LATERALITY: ICD-10-CM

## 2023-05-08 DIAGNOSIS — E55.9 VITAMIN D DEFICIENCY: ICD-10-CM

## 2023-05-08 DIAGNOSIS — G89.29 CHRONIC PAIN OF RIGHT KNEE: Primary | ICD-10-CM

## 2023-05-08 DIAGNOSIS — E78.00 ELEVATED LDL CHOLESTEROL LEVEL: ICD-10-CM

## 2023-05-08 DIAGNOSIS — R07.89 CHEST WALL PAIN: ICD-10-CM

## 2023-05-08 DIAGNOSIS — M54.50 CHRONIC LOW BACK PAIN WITHOUT SCIATICA, UNSPECIFIED BACK PAIN LATERALITY: ICD-10-CM

## 2023-05-08 DIAGNOSIS — R11.0 NAUSEA: ICD-10-CM

## 2023-05-08 DIAGNOSIS — M25.562 CHRONIC PAIN OF LEFT KNEE: ICD-10-CM

## 2023-05-08 PROCEDURE — 99214 OFFICE O/P EST MOD 30 MIN: CPT | Performed by: STUDENT IN AN ORGANIZED HEALTH CARE EDUCATION/TRAINING PROGRAM

## 2023-05-08 RX ORDER — ACETAMINOPHEN 325 MG/1
650 TABLET ORAL EVERY 6 HOURS PRN
COMMUNITY

## 2023-05-08 RX ORDER — CYCLOBENZAPRINE HCL 5 MG
5-10 TABLET ORAL 3 TIMES DAILY PRN
Qty: 60 TABLET | Refills: 1 | Status: SHIPPED | OUTPATIENT
Start: 2023-05-08

## 2023-05-08 RX ORDER — ONDANSETRON 4 MG/1
TABLET, ORALLY DISINTEGRATING ORAL
Qty: 30 TABLET | Refills: 1 | Status: SHIPPED | OUTPATIENT
Start: 2023-05-08

## 2023-05-08 ASSESSMENT — PATIENT HEALTH QUESTIONNAIRE - PHQ9
SUM OF ALL RESPONSES TO PHQ QUESTIONS 1-9: 3
SUM OF ALL RESPONSES TO PHQ9 QUESTIONS 1 & 2: 3
2. FEELING DOWN, DEPRESSED OR HOPELESS: 1
SUM OF ALL RESPONSES TO PHQ QUESTIONS 1-9: 3
1. LITTLE INTEREST OR PLEASURE IN DOING THINGS: 2

## 2023-05-08 NOTE — PROGRESS NOTES
Progress Note    she is a 25y.o. year old female who presents for evaluation Knee Pain and Discuss Medications  . Assessment/ Plan:     There are no diagnoses linked to this encounter. No problem-specific Assessment & Plan notes found for this encounter. Problem List    None        No follow-ups on file. I have discussed the diagnosis with the patient and the intended plan as seen in the above orders. Medication Side Effects and Warnings were discussed with patient  The patient was instructed to access after-visit summary via [employer] and questions were answered concerning future plans. Patient conveyed understanding of plan. Subjective:     Chief Complaint   Patient presents with    Knee Pain    Discuss Medications     The patient was located at Home: [address] [place] [address]. [facility] [id number]    She had a procedure (upper endoscopy with Dr. Aaron Cheng) done   She was told that everything was fine   Biopsy was done  She was told that they were caused by ibuprofen. She was given a prescription for naproxen from the knee doctor previously   L>R   Knee pain is still an issue, sometimes has to limit activity due to it   No previous PT  Chest pain has improved off of the ibuprofen and naproxen  Also with some back issues, whole back   No radiation down her leg(s)      PMHx, RxHx, FmHx, SocHx, AllgHx were reviewed and updated in the chart. Review of Systems - negative except as noted above      Objective: There were no vitals filed for this visit.     Current Outpatient Medications   Medication Sig    acetaminophen (TYLENOL) 325 MG tablet Take 2 tablets by mouth every 6 hours as needed for Pain    butalbital-acetaminophen-caffeine (FIORICET, ESGIC) -40 MG per tablet ceived the following from [facility]: Outside name: butalbital-acetaminophen-caffeine (FIORICET, ESGIC) -40 mg per tablet    diphenhydrAMINE (BENADRYL) 25 MG capsule Take 1

## 2023-05-08 NOTE — PATIENT INSTRUCTIONS
Exercise recommendations  150 minutes of moderate intensity cardio exercise in the week  3 days of total body strength training  Work on stretching/flexibility as well. It's great to get outside! But you can also check out youtube for fun videos and workouts.   I like yoga with rashaad

## 2023-05-08 NOTE — ASSESSMENT & PLAN NOTE
Occasional, unknown cause.   No emesis  Well-controlled with Zofran, refill today  Consider further work-up for worsening or new symptoms

## 2023-05-08 NOTE — ASSESSMENT & PLAN NOTE
Unable to tolerate NSAIDs, ?   Esophagitis or gastritis on recent endoscopy  Refer for physical therapy  Encouraged to stay active, encouraged to utilize water-based aerobics, swimming, walking to decrease impact on knees and back

## 2023-05-08 NOTE — ASSESSMENT & PLAN NOTE
Lab Results   Component Value Date/Time    VITD25 19.6 01/06/2023 12:00 AM   No current supplement, encouraged to start at least 2000 IU daily  Recheck in 3 months

## 2023-05-17 ENCOUNTER — HOSPITAL ENCOUNTER (EMERGENCY)
Facility: HOSPITAL | Age: 23
Discharge: HOME | End: 2023-05-17
Attending: EMERGENCY MEDICINE
Payer: MEDICAID

## 2023-05-17 ENCOUNTER — APPOINTMENT (OUTPATIENT)
Facility: HOSPITAL | Age: 23
End: 2023-05-17
Payer: MEDICAID

## 2023-05-17 VITALS
SYSTOLIC BLOOD PRESSURE: 127 MMHG | DIASTOLIC BLOOD PRESSURE: 80 MMHG | BODY MASS INDEX: 27.28 KG/M2 | RESPIRATION RATE: 16 BRPM | HEART RATE: 72 BPM | TEMPERATURE: 98.4 F | HEIGHT: 68 IN | WEIGHT: 180 LBS

## 2023-05-17 DIAGNOSIS — N83.201 BILATERAL OVARIAN CYSTS: ICD-10-CM

## 2023-05-17 DIAGNOSIS — N83.202 BILATERAL OVARIAN CYSTS: ICD-10-CM

## 2023-05-17 DIAGNOSIS — N93.9 VAGINAL BLEEDING: Primary | ICD-10-CM

## 2023-05-17 LAB
ALBUMIN SERPL-MCNC: 4.3 G/DL (ref 3.5–5.2)
ALBUMIN/GLOB SERPL: 1.2 (ref 1.1–2.2)
ALP SERPL-CCNC: 74 U/L (ref 35–104)
ALT SERPL-CCNC: 6 U/L (ref 10–35)
ANION GAP SERPL CALC-SCNC: 10 MMOL/L (ref 5–15)
APPEARANCE UR: CLEAR
AST SERPL-CCNC: 15 U/L (ref 10–35)
BACTERIA URNS QL MICRO: ABNORMAL /HPF
BASOPHILS # BLD: 0 K/UL (ref 0–1)
BASOPHILS NFR BLD: 0 % (ref 0–1)
BILIRUB SERPL-MCNC: 0.2 MG/DL (ref 0.2–1)
BILIRUB UR QL: NEGATIVE
BUN SERPL-MCNC: 6 MG/DL (ref 6–20)
BUN/CREAT SERPL: 9 (ref 12–20)
CALCIUM SERPL-MCNC: 9.2 MG/DL (ref 8.6–10)
CHLORIDE SERPL-SCNC: 107 MMOL/L (ref 98–107)
CO2 SERPL-SCNC: 25 MMOL/L (ref 22–29)
COLOR UR: ABNORMAL
CREAT SERPL-MCNC: 0.67 MG/DL (ref 0.5–0.9)
DIFFERENTIAL METHOD BLD: ABNORMAL
EOSINOPHIL # BLD: 0.1 K/UL (ref 0–0.4)
EOSINOPHIL NFR BLD: 2 %
EPITH CASTS URNS QL MICRO: ABNORMAL /LPF
ERYTHROCYTE [DISTWIDTH] IN BLOOD BY AUTOMATED COUNT: 12.9 % (ref 11.5–14.5)
GLOBULIN SER CALC-MCNC: 3.6 G/DL (ref 2–4)
GLUCOSE SERPL-MCNC: 93 MG/DL (ref 65–100)
GLUCOSE UR STRIP.AUTO-MCNC: NEGATIVE MG/DL
HCG UR QL: NEGATIVE
HCT VFR BLD AUTO: 35.3 % (ref 35–47)
HGB BLD-MCNC: 11.9 G/DL (ref 11.5–16)
HGB UR QL STRIP: ABNORMAL
IMM GRANULOCYTES # BLD AUTO: 0 K/UL (ref 0–0.04)
IMM GRANULOCYTES NFR BLD AUTO: 0 % (ref 0–0.5)
KETONES UR QL STRIP.AUTO: NEGATIVE MG/DL
LEUKOCYTE ESTERASE UR QL STRIP.AUTO: NEGATIVE
LYMPHOCYTES # BLD: 2.7 K/UL (ref 0.8–3.5)
LYMPHOCYTES NFR BLD: 37 % (ref 12–49)
MCH RBC QN AUTO: 28.9 PG (ref 26–34)
MCHC RBC AUTO-ENTMCNC: 33.7 G/DL (ref 30–36.5)
MCV RBC AUTO: 85.7 FL (ref 80–99)
MONOCYTES # BLD: 0.5 K/UL (ref 0–1)
MONOCYTES NFR BLD: 7 % (ref 5–13)
NEUTS SEG # BLD: 3.9 K/UL (ref 1.8–8)
NEUTS SEG NFR BLD: 54 % (ref 32–75)
NITRITE UR QL STRIP.AUTO: NEGATIVE
NRBC # BLD: 0 K/UL (ref 0–0.01)
NRBC BLD-RTO: 0 PER 100 WBC
PH UR STRIP: 7.5 (ref 5–8)
PLATELET # BLD AUTO: 362 K/UL (ref 150–400)
PMV BLD AUTO: 10.6 FL (ref 8.9–12.9)
POTASSIUM SERPL-SCNC: 4.3 MMOL/L (ref 3.5–5.1)
PROT SERPL-MCNC: 7.9 G/DL (ref 6.4–8.3)
PROT UR STRIP-MCNC: NEGATIVE MG/DL
RBC # BLD AUTO: 4.12 M/UL (ref 3.8–5.2)
RBC #/AREA URNS HPF: ABNORMAL /HPF
SODIUM SERPL-SCNC: 142 MMOL/L (ref 136–145)
SP GR UR REFRACTOMETRY: 1.01 (ref 1–1.03)
UROBILINOGEN UR QL STRIP.AUTO: 1 EU/DL (ref 0.2–1)
WBC # BLD AUTO: 7.3 K/UL (ref 3.6–11)
WBC URNS QL MICRO: ABNORMAL /HPF (ref 0–4)

## 2023-05-17 PROCEDURE — 36415 COLL VENOUS BLD VENIPUNCTURE: CPT

## 2023-05-17 PROCEDURE — 81025 URINE PREGNANCY TEST: CPT

## 2023-05-17 PROCEDURE — 99284 EMERGENCY DEPT VISIT MOD MDM: CPT

## 2023-05-17 PROCEDURE — 81001 URINALYSIS AUTO W/SCOPE: CPT

## 2023-05-17 PROCEDURE — 85025 COMPLETE CBC W/AUTO DIFF WBC: CPT

## 2023-05-17 PROCEDURE — 80053 COMPREHEN METABOLIC PANEL: CPT

## 2023-05-17 PROCEDURE — 76830 TRANSVAGINAL US NON-OB: CPT

## 2023-05-17 PROCEDURE — 76856 US EXAM PELVIC COMPLETE: CPT

## 2023-05-17 ASSESSMENT — ENCOUNTER SYMPTOMS
COUGH: 0
RHINORRHEA: 0
DIARRHEA: 0
SHORTNESS OF BREATH: 0
SORE THROAT: 0
CONSTIPATION: 0
VOMITING: 0
NAUSEA: 0
ABDOMINAL PAIN: 0

## 2023-05-17 ASSESSMENT — LIFESTYLE VARIABLES
HOW OFTEN DO YOU HAVE A DRINK CONTAINING ALCOHOL: 2-4 TIMES A MONTH
HOW MANY STANDARD DRINKS CONTAINING ALCOHOL DO YOU HAVE ON A TYPICAL DAY: 5 OR 6

## 2023-05-17 ASSESSMENT — PAIN - FUNCTIONAL ASSESSMENT: PAIN_FUNCTIONAL_ASSESSMENT: 0-10

## 2023-05-17 NOTE — ED TRIAGE NOTES
Pt ambulatory to ER for irregular menstrual cycle. States that she took morning after pill 5/8 and started menstrual cycle early on 5/11. Pt concerned that her period is lasting longer than normal with heavy bleeding. Reports feeling weak.

## 2023-05-17 NOTE — ED PROVIDER NOTES
[facility] EMERGENCY DEPT  EMERGENCY DEPARTMENT ENCOUNTER      Pt Name: Dario Curtis  MRN: [id number]  [place] 2000  Date of evaluation: 5/17/2023  Provider: Markell Allred [address] [place] [address]       Chief Complaint   Patient presents with    Vaginal Bleeding         HISTORY OF PRESENT ILLNESS    Patient is a 20-year-old female with history of anxiety and seasonal allergies who presents to the ER with reports of irregular menstrual cycle. She states that she took Plan B on 5/8 and started her menstrual cycle early on 5/11. Patient has concerned that her period is lasting longer than normal with heavy bleeding. She states that she is going through 5-6 tampons/pads throughout the day. She states that the bleeding started before her cramping which is abnormal. Patient denies any shortness of breath or chest pain but admits to feeling tired and weak throughout the past week. She denies being pregnant before. She denies any urinary symptoms at this time. She did not report any concerns for sexually transmitted infections. She denies alcohol use, smoking/vaping or illicit drug use. Nursing Notes were reviewed. REVIEW OF SYSTEMS       Review of Systems   Constitutional:  Negative for activity change, appetite change and fever. HENT:  Negative for congestion, rhinorrhea and sore throat. Respiratory:  Negative for cough and shortness of breath. Cardiovascular:  Negative for chest pain and palpitations. Gastrointestinal:  Negative for abdominal pain, constipation, diarrhea, nausea and vomiting. Genitourinary:  Positive for menstrual problem, pelvic pain and vaginal bleeding. Negative for decreased urine volume, difficulty urinating, dysuria, flank pain, frequency, hematuria, urgency, vaginal discharge and vaginal pain. Skin:  Negative for rash. Neurological:  Negative for headaches. Psychiatric/Behavioral:  Negative for agitation and behavioral problems.         PAST MEDICAL HISTORY     Past

## 2023-05-18 NOTE — ED NOTES
The patient was discharged home by ER MD Omar Beckman and Nurse Ramya Cee  in stable condition . The patient is alert and oriented, is in no respiratory distress and has vital signs within normal limits . The patient's diagnosis, condition and treatment were explained to patient. The patient/responsible party expressed understanding. No prescriptions given to pt. Work note given to pt. A discharge plan has been developed. A  was not involved in the process. Aftercare instructions were given to the patient.        Jeanette Baez RN  05/17/23 [id number]

## 2023-05-18 NOTE — DISCHARGE INSTRUCTIONS
Discussed visit today. Please follow-up with your OB/GYN for the ovarian cysts. You can take ibuprofen/Midol for the cramping. Cramping and bleeding is a normal process after taking emergent contraception. Please return to the ER for worsening symptoms, fatigue or shortness of breath.

## 2023-05-30 ENCOUNTER — HOSPITAL ENCOUNTER (OUTPATIENT)
Facility: HOSPITAL | Age: 23
Setting detail: RECURRING SERIES
Discharge: HOME | End: 2023-06-02
Payer: MEDICAID

## 2023-05-30 PROCEDURE — 97161 PT EVAL LOW COMPLEX 20 MIN: CPT

## 2023-05-30 NOTE — THERAPY EVALUATION
[address] [place] [address]  Phone: [phone]    Fax: [phone]         PHYSICAL THERAPY - MEDICARE EVALUATION/PLAN OF CARE NOTE (updated 3/23)      Date: 2023          Patient Name:  Haresh Christina :  2000   Medical   Diagnosis:  Chronic pain of right knee [M25.561, G89.29]  Chronic pain of left knee [M25.562, G89.29]  Chronic low back pain without sciatica, unspecified back pain laterality [M54.50, G89.29] Treatment Diagnosis:  M25.561  RIGHT KNEE PAIN and M25.562  LEFT KNEE PAIN  and M54.59  OTHER LOWER BACK PAIN    Referral Source:  Krystina Rivera MD Provider #:  [id number]                Insurance: Payor: Mildred Mcardle / Plan: Adry Payne / Product Type: *No Product type* /      Patient  verified yes     Visit #   Current  / Total 1 24   Time   In / Out 110 200   Total Treatment Time 50   Total Timed Codes 7   1:1 Treatment Time 7      [facility] Totals Reminder:  bill using total billable   min of TIMED therapeutic procedures and modalities. 8-22 min = 1 unit; 23-37 min = 2 units; 38-52 min = 3 units;  53-67 min = 4 units; 68-82 min = 5 units           SUBJECTIVE  Pain Level (0-10 scale): 2  []constant [x]intermittent []improving []worsening []no change since onset    Any medication changes, allergies to medications, adverse drug reactions, diagnosis change, or new procedure performed?: [x] No    [] Yes (see summary sheet for update)  Medications: Verified on Patient Summary List    Subjective functional status/changes:     Patient describes low back pain and L>R knee pain during the day that has been present for years and it is gradually worsening . She reports that she was active in sports, including cheerleading and basketball, while in school but is not very active now. She works as a  and has increased pain when bending over to do pedicures.  As a side note,

## 2023-06-07 ENCOUNTER — OFFICE VISIT (OUTPATIENT)
Age: 23
End: 2023-06-07
Payer: MEDICAID

## 2023-06-07 DIAGNOSIS — Z87.42 HISTORY OF OVARIAN CYST: Primary | ICD-10-CM

## 2023-06-07 PROCEDURE — 99212 OFFICE O/P EST SF 10 MIN: CPT | Performed by: OBSTETRICS & GYNECOLOGY

## 2023-06-07 NOTE — PROGRESS NOTES
Marie Chua is a 25 y.o. female presents for a problem visit. Had ultrasound in our ofice today for newfound cysts on her ovaries. Chief Complaint   Patient presents with    Ultrasound    Other     Cysts on ovaries     Patient's last menstrual period was 05/11/2023 (exact date). Birth Control: condoms sometimes. Last Pap: 4/11/2023; NILM; - for STIs    The patient is reporting having:  lower abdominal pain after taking a Plan B pill  for 3 weeks. She reports the symptoms are has slightly improved. TV ULTRASOUND PERFORMED. UTERUS IS ANTEVERTED, NORMAL IN SIZE AND ECHOGENICITY. ENDOMETRIUM MEASURES 7-8MM IN THICKNESS. NO EVIDENCE OF MASSES OR ABNORMALITIES ARE SEEN. RIGHT OVARY APPEARS WITHIN NORMAL LIMITS. LEFT OVARY APPEARS WITHIN NORMAL LIMITS. FREE FLUID SEEN IN THE CDS. Examination chaperoned by Patti Nj LPN.
from the patient  Constitutional: negative for weight loss, fever, night sweats  Breast: negative for breast lumps, nipple discharge, galactorrhea  GI: negative for change in bowel habits, abdominal pain, black or bloody stools  : negative for frequency, dysuria, hematuria, vaginal discharge  MSK: negative for back pain, joint pain, muscle pain  Skin: negative for itching, rash, hives  Psych: negative for anxiety, depression, change in mood      Objective:  LMP 05/11/2023 (Exact Date)     Physical Exam:   PHYSICAL EXAMINATION    Constitutional  Appearance: well-nourished, well developed, alert, in no acute distress      Skin  General Inspection: no rash, no lesions identified    Neurologic/Psychiatric  Mental Status:  Orientation: grossly oriented to person, place and time  Mood and Affect: mood normal, affect appropriate    Reviewed US from the ED    ASSESSMENT:    ICD-10-CM    1. History of ovarian cyst  Z87.42           PLAN:  Given Laura Cowart information  Call with menses      RTO prn if symptoms persist or worsen. Instructions given to pt. Handouts given to pt. Today, 06/07/23, I personally spent more than 15 minutes in review of records, documentation,  and face to face counseling with the patient discussing the diagnosis, plan of care and importance of compliance with the treatment plan and performing an exam as well as ordering any appropropriate labs, procedures, or medications.

## 2023-07-12 ENCOUNTER — HOSPITAL ENCOUNTER (OUTPATIENT)
Facility: HOSPITAL | Age: 23
Setting detail: RECURRING SERIES
Discharge: HOME | End: 2023-07-15
Payer: MEDICAID

## 2023-07-12 PROCEDURE — 97110 THERAPEUTIC EXERCISES: CPT | Performed by: PHYSICAL THERAPIST

## 2023-07-12 NOTE — PROGRESS NOTES
PHYSICAL THERAPY - DAILY TREATMENT NOTE (updated 3/23)      Date: 2023          Patient Name:  Fernanda Briggs :  2000   Medical   Diagnosis:  Chronic pain of right knee [M25.561, G89.29]  Chronic pain of left knee [M25.562, G89.29]  Chronic low back pain without sciatica, unspecified back pain laterality [M54.50, G89.29] Treatment Diagnosis:  M25.561  RIGHT KNEE PAIN and M25.562  LEFT KNEE PAIN  and M54.59  OTHER LOWER BACK PAIN    Referral Source:  Dwain Durand MD Insurance:   Payor: [facility] / Plan: [place] [facility] / Product Type: *No Product type* /                     Patient  verified yes     Visit #   Current  / Total 2 24   Time   In / Out 1015 am  1100 am    Total Treatment Time 45 minutes   Total Timed Codes 40 minutes         SUBJECTIVE    Pain Level (0-10 scale): 2    Any medication changes, allergies to medications, adverse drug reactions, diagnosis change, or new procedure performed?: [x] No    [] Yes (see summary sheet for update)  Medications: Verified on Patient Summary List    Subjective functional status/changes: Today, mild back pain, right medial knee pain. 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 OBJECTIVE      Therapeutic Procedures: Tx Min Billable or 1:1 Min (if diff from Tx Min) Procedure, Rationale, Specifics   40  69677 Therapeutic Exercise (timed):  increase ROM, strength, coordination, balance, and proprioception to improve patient's ability to progress to PLOF and address remaining functional goals. (see flow sheet as applicable)     Details if applicable:            Details if applicable:           Details if applicable:           Details if applicable:            Details if applicable:     40     Total Total     Pain Level at end of session (0-10 scale): 2      Assessment   Good return of today's program.  No adverse reactions.      Patient will continue to benefit from skilled PT / OT services

## 2023-07-18 ENCOUNTER — HOSPITAL ENCOUNTER (OUTPATIENT)
Facility: HOSPITAL | Age: 23
Setting detail: RECURRING SERIES
Discharge: HOME | End: 2023-07-21
Payer: MEDICAID

## 2023-07-18 PROCEDURE — 97110 THERAPEUTIC EXERCISES: CPT | Performed by: PHYSICAL THERAPIST

## 2023-07-18 NOTE — PROGRESS NOTES
PHYSICAL THERAPY - DAILY TREATMENT NOTE (updated 3/23)      Date: 2023          Patient Name:  Ling Winn :  2000   Medical   Diagnosis:  Chronic pain of right knee [M25.561, G89.29]  Chronic pain of left knee [M25.562, G89.29]  Chronic low back pain without sciatica, unspecified back pain laterality [M54.50, G89.29] Treatment Diagnosis:  M25.561  RIGHT KNEE PAIN and M25.562  LEFT KNEE PAIN  and M54.59  OTHER LOWER BACK PAIN    Referral Source:  Hortensia Vargas MD Insurance:   Payor: [employer] / Plan: [employer] / Product Type: *No Product type* /                     Patient  verified yes     Visit #   Current  / Total 2 24   Time   In / Out 1125 am  1215 pm    Total Treatment Time 50 minutes   Total Timed Codes 43 minutes         SUBJECTIVE    Pain Level (0-10 scale): 2    Any medication changes, allergies to medications, adverse drug reactions, diagnosis change, or new procedure performed?: [x] No    [] Yes (see summary sheet for update)  Medications: Verified on Patient Summary List    Subjective functional status/changes:       Very mild sore after the last session. OBJECTIVE      Therapeutic Procedures: Tx Min Billable or 1:1 Min (if diff from Tx Min) Procedure, Rationale, Specifics   43  [id number] Therapeutic Exercise (timed):  increase ROM, strength, coordination, balance, and proprioception to improve patient's ability to progress to PLOF and address remaining functional goals. (see flow sheet as applicable)     Details if applicable:            Details if applicable:           Details if applicable:           Details if applicable:            Details if applicable:     43     Total Total     Pain Level at end of session (0-10 scale): 2    HEP updated to include:  knee planks/clams 1 and 2       Assessment   Good return of today's program.  No adverse reactions.      Patient will continue to benefit from skilled PT / OT services to modify and progress

## 2023-07-20 ENCOUNTER — HOSPITAL ENCOUNTER (OUTPATIENT)
Facility: HOSPITAL | Age: 23
Setting detail: RECURRING SERIES
Discharge: HOME | End: 2023-07-23
Payer: MEDICAID

## 2023-07-20 PROCEDURE — 97110 THERAPEUTIC EXERCISES: CPT | Performed by: PHYSICAL THERAPIST

## 2023-07-20 NOTE — PROGRESS NOTES
PHYSICAL THERAPY - DAILY TREATMENT NOTE (updated 3/23)      Date: 2023          Patient Name:  Belkis Douglas :  2000   Medical   Diagnosis:  Chronic pain of right knee [M25.561, G89.29]  Chronic pain of left knee [M25.562, G89.29]  Chronic low back pain without sciatica, unspecified back pain laterality [M54.50, G89.29] Treatment Diagnosis:  M25.561  RIGHT KNEE PAIN and M25.562  LEFT KNEE PAIN  and M54.59  OTHER LOWER BACK PAIN    Referral Source:  Ivette Giron MD Insurance:   Payor: Scooby Flores / Plan: [employer] / Product Type: *No Product type* /                     Patient  verified yes     Visit #   Current  / Total 5 24   Time   In / Out 950 am  1030 am    Total Treatment Time 40 minutes   Total Timed Codes 40 minutes         SUBJECTIVE    Pain Level (0-10 scale): 5, posterior left knee. Any medication changes, allergies to medications, adverse drug reactions, diagnosis change, or new procedure performed?: [x] No    [] Yes (see summary sheet for update)  Medications: Verified on Patient Summary List    Subjective functional status/changes:       Tired after today's session. OBJECTIVE      Therapeutic Procedures: Tx Min Billable or 1:1 Min (if diff from Tx Min) Procedure, Rationale, Specifics   40  [id number] Therapeutic Exercise (timed):  increase ROM, strength, coordination, balance, and proprioception to improve patient's ability to progress to PLOF and address remaining functional goals. (see flow sheet as applicable)     Details if applicable:            Details if applicable:           Details if applicable:           Details if applicable:            Details if applicable:     40     Total Total     Pain Level at end of session (0-10 scale): 5    HEP updated to include:  short plank. 10 seconds hold  1 and 2       Assessment   Good return demonstration of HEP. Patient was moderately fatigued without adverse reactions.      Patient will continue to

## 2023-07-25 ENCOUNTER — HOSPITAL ENCOUNTER (OUTPATIENT)
Facility: HOSPITAL | Age: 23
Setting detail: RECURRING SERIES
Discharge: HOME | End: 2023-07-28
Payer: MEDICAID

## 2023-07-25 PROCEDURE — 97110 THERAPEUTIC EXERCISES: CPT | Performed by: PHYSICAL THERAPIST

## 2023-07-25 NOTE — PROGRESS NOTES
PHYSICAL THERAPY - DAILY TREATMENT NOTE (updated 3/23)      Date: 2023          Patient Name:  Betty Vasquez :  2000   Medical   Diagnosis:  Chronic pain of right knee [M25.561, G89.29]  Chronic pain of left knee [M25.562, G89.29]  Chronic low back pain without sciatica, unspecified back pain laterality [M54.50, G89.29] Treatment Diagnosis:  M25.561  RIGHT KNEE PAIN and M25.562  LEFT KNEE PAIN  and M54.59  OTHER LOWER BACK PAIN    Referral Source:  Luis Petersen MD Insurance:   Payor: Loree Cook / Plan: Crhis Oar / Product Type: *No Product type* /                     Patient  verified yes     Visit #   Current  / Total 5 24   Time   In / Out 1130 am  1120 am    Total Treatment Time 50 minutes   Total Timed Codes 40 minutes         SUBJECTIVE    Pain Level (0-10 scale): 0, posterior left knee. Any medication changes, allergies to medications, adverse drug reactions, diagnosis change, or new procedure performed?: [x] No    [] Yes (see summary sheet for update)  Medications: Verified on Patient Summary List    Subjective functional status/changes:       Tired after today's session. OBJECTIVE      Therapeutic Procedures: Tx Min Billable or 1:1 Min (if diff from Tx Min) Procedure, Rationale, Specifics   41  [id number] Therapeutic Exercise (timed):  increase ROM, strength, coordination, balance, and proprioception to improve patient's ability to progress to PLOF and address remaining functional goals. (see flow sheet as applicable)     Details if applicable:            Details if applicable:           Details if applicable:           Details if applicable:            Details if applicable:     41     Total Total     Pain Level at end of session (0-10 scale): 5    HEP updated to include:  short plank. 10 seconds hold  1 and 2       Assessment   Good return demonstration of HEP. Patient was moderately fatigued without adverse reactions.    Note:  gait WNL, no

## 2023-07-27 ENCOUNTER — HOSPITAL ENCOUNTER (OUTPATIENT)
Facility: HOSPITAL | Age: 23
Setting detail: RECURRING SERIES
Discharge: HOME | End: 2023-07-30
Payer: MEDICAID

## 2023-07-27 PROCEDURE — 97110 THERAPEUTIC EXERCISES: CPT | Performed by: PHYSICAL THERAPIST

## 2023-07-27 NOTE — PROGRESS NOTES
PHYSICAL THERAPY - DAILY TREATMENT NOTE (updated 3/23)      Date: 2023          Patient Name:  Brett Gunn :  2000   Medical   Diagnosis:  Chronic pain of right knee [M25.561, G89.29]  Chronic pain of left knee [M25.562, G89.29]  Chronic low back pain without sciatica, unspecified back pain laterality [M54.50, G89.29] Treatment Diagnosis:  M25.561  RIGHT KNEE PAIN and M25.562  LEFT KNEE PAIN  and M54.59  OTHER LOWER BACK PAIN    Referral Source:  Raul Velarde MD Insurance:   Payor: Westley Cedeno / Plan: Karen Daily / Product Type: *No Product type* /                     Patient  verified yes     Visit #   Current  / Total 6 24   Time   In / Out 1130 am  1120 am    Total Treatment Time 45 minutes   Total Timed Codes 43 minutes         SUBJECTIVE    Pain Level (0-10 scale): 3, posterior left knee. Any medication changes, allergies to medications, adverse drug reactions, diagnosis change, or new procedure performed?: [x] No    [] Yes (see summary sheet for update)  Medications: Verified on Patient Summary List    Subjective functional status/changes:       Overall, no changes. OBJECTIVE      Therapeutic Procedures: Tx Min Billable or 1:1 Min (if diff from Tx Min) Procedure, Rationale, Specifics   43  56169 Therapeutic Exercise (timed):  increase ROM, strength, coordination, balance, and proprioception to improve patient's ability to progress to PLOF and address remaining functional goals. (see flow sheet as applicable)     Details if applicable:            Details if applicable:           Details if applicable:           Details if applicable:            Details if applicable:     43     Total Total     Pain Level at end of session (0-10 scale): 5    HEP updated to include:  side plank, bent knees (hold x 5 seconds)       Assessment   Good return demonstration of HEP. Patient was moderately fatigued without adverse reactions.    Note:  gait WNL, no difficulty

## 2023-09-08 ENCOUNTER — OFFICE VISIT (OUTPATIENT)
Age: 23
End: 2023-09-08
Payer: MEDICAID

## 2023-09-08 VITALS
HEART RATE: 67 BPM | HEIGHT: 68 IN | TEMPERATURE: 98.9 F | WEIGHT: 193.4 LBS | DIASTOLIC BLOOD PRESSURE: 63 MMHG | SYSTOLIC BLOOD PRESSURE: 107 MMHG | BODY MASS INDEX: 29.31 KG/M2 | OXYGEN SATURATION: 98 %

## 2023-09-08 DIAGNOSIS — R73.03 PREDIABETES: ICD-10-CM

## 2023-09-08 DIAGNOSIS — J35.1 ENLARGED TONSILS: ICD-10-CM

## 2023-09-08 DIAGNOSIS — N94.6 DYSMENORRHEA: ICD-10-CM

## 2023-09-08 DIAGNOSIS — F51.04 PSYCHOPHYSIOLOGICAL INSOMNIA: ICD-10-CM

## 2023-09-08 DIAGNOSIS — G44.209 TENSION HEADACHE: ICD-10-CM

## 2023-09-08 DIAGNOSIS — R09.81 CONGESTION OF NASAL SINUS: ICD-10-CM

## 2023-09-08 DIAGNOSIS — Z00.00 ENCOUNTER FOR WELL ADULT EXAM WITHOUT ABNORMAL FINDINGS: Primary | ICD-10-CM

## 2023-09-08 PROBLEM — N92.6 IRREGULAR MENSES: Status: RESOLVED | Noted: 2021-10-13 | Resolved: 2023-09-08

## 2023-09-08 PROCEDURE — 99214 OFFICE O/P EST MOD 30 MIN: CPT | Performed by: STUDENT IN AN ORGANIZED HEALTH CARE EDUCATION/TRAINING PROGRAM

## 2023-09-08 PROCEDURE — 99395 PREV VISIT EST AGE 18-39: CPT | Performed by: STUDENT IN AN ORGANIZED HEALTH CARE EDUCATION/TRAINING PROGRAM

## 2023-09-08 RX ORDER — ACETAMINOPHEN 325 MG/1
650 TABLET ORAL EVERY 6 HOURS PRN
COMMUNITY

## 2023-09-08 RX ORDER — LANOLIN ALCOHOL/MO/W.PET/CERES
3 CREAM (GRAM) TOPICAL DAILY
COMMUNITY

## 2023-09-08 RX ORDER — DIPHENHYDRAMINE HCL 25 MG
25 CAPSULE ORAL EVERY 6 HOURS PRN
COMMUNITY

## 2023-09-08 RX ORDER — TOPIRAMATE 50 MG/1
50 TABLET, FILM COATED ORAL 2 TIMES DAILY
Qty: 60 TABLET | Refills: 5 | Status: SHIPPED | OUTPATIENT
Start: 2023-09-08

## 2023-09-08 NOTE — PATIENT INSTRUCTIONS
Instructions to find a therapist:    1. Talk to insurance or look online to find in-network therapy options. 2.  Find out insurance out of network reimbursement. Look into options: [employer], there are lots of text and video/phone therapy options online as well. 3.  [facility] mental health resources. Lifestyle areas to focus on. .. Exercise  Nutrition  Sleep  Mindfulness - deep/diaphragmatic breathing, progressive muscle relaxation, guided imagery, yoga, gary chi, meditation, prayer, Mormon reading, journaling, coloring/art   Social connectedness   Check out WILD 5 workbook on link bird. Allergy medicines will work best if taken everyday. I recommend starting with anti-histamine (xyzal, allegra, claritin, zyrtec, and their generics) and nasal steroid spray (nasacort, nasonex, flonase, and their generics). If avoiding medicines: use warm salt gargles, especially first thing in the morning. Also nasal saline sprays and sinus flushes, especially after being exposed to your triggers. If things still aren't good after 2 weeks of consistent medication use, contact Dr. Kevon Coffey to add Singulair. Exercise recommendations  150 minutes of moderate intensity cardio exercise in the week  3 days of total body strength training  Work on stretching/flexibility as well. It's great to get outside! But you can also check out youtube for fun videos and workouts.   I like yoga with rashaad

## 2023-09-08 NOTE — PROGRESS NOTES
Well Adult Note  Name: Coreen Nick Date: 2023   MRN: [id number] Sex: Female   Age: 21 y.o. Ethnicity: Non- / Non    : 2000 Race: Joelle Rojas / Christofer Mejia is here for well adult exam.  Fasting for labs  History:  Chief Complaint   Patient presents with    Annual Exam    Headache     X 3-4 weeks, lasting several hours. Has been taking tylenol with little to no relief. States that they are in the front of her forehead and behind her eye. Throbbing in nature. Onset typically within 1-2 hours of waking  Worse if she leaves the home  She will have a headache when she gets to work, worse with sunlight (lots of windows). She has been taking tylenol without much relief. She is taking topamax 50 mg nightly. She isn't sure that it is helping. She has been on it a couple months. She is having a lot of congestion when she tries to sleep. She isn't sure if she snores. 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 No menorrhagia. Regular  Talking to OB-GYN about IUD, planning to do that. Review of Systems   All other systems reviewed and are negative. No Known Allergies      Prior to Visit Medications    Medication Sig Taking?  Authorizing Provider   acetaminophen (TYLENOL) 325 MG tablet Take 2 tablets by mouth every 6 hours as needed for Pain Yes Historical Provider, MD   Multiple Vitamin (MULTIVITAMIN ADULT PO) Take by mouth Yes Historical Provider, MD   melatonin 3 MG TABS tablet Take 1 tablet by mouth daily Yes Historical Provider, MD   diphenhydrAMINE (BENADRYL ALLERGY) 25 MG capsule Take 1 capsule by mouth every 6 hours as needed for Itching Yes Historical Provider, MD   topiramate (TOPAMAX) 50 MG tablet Take 1 tablet by mouth 2 times daily Yes Casey Serrano

## 2024-05-07 ENCOUNTER — OFFICE VISIT (OUTPATIENT)
Age: 24
End: 2024-05-07
Payer: MEDICAID

## 2024-05-07 VITALS — SYSTOLIC BLOOD PRESSURE: 124 MMHG | DIASTOLIC BLOOD PRESSURE: 81 MMHG | BODY MASS INDEX: 31.29 KG/M2 | WEIGHT: 205.8 LBS

## 2024-05-07 DIAGNOSIS — Z01.419 ENCNTR FOR GYN EXAM (GENERAL) (ROUTINE) W/O ABN FINDINGS: Primary | ICD-10-CM

## 2024-05-07 DIAGNOSIS — Z11.3 SCREEN FOR STD (SEXUALLY TRANSMITTED DISEASE): ICD-10-CM

## 2024-05-07 PROCEDURE — 99395 PREV VISIT EST AGE 18-39: CPT | Performed by: OBSTETRICS & GYNECOLOGY

## 2024-05-07 SDOH — ECONOMIC STABILITY: HOUSING INSECURITY
IN THE LAST 12 MONTHS, WAS THERE A TIME WHEN YOU DID NOT HAVE A STEADY PLACE TO SLEEP OR SLEPT IN A SHELTER (INCLUDING NOW)?: PATIENT DECLINED

## 2024-05-07 SDOH — ECONOMIC STABILITY: INCOME INSECURITY: HOW HARD IS IT FOR YOU TO PAY FOR THE VERY BASICS LIKE FOOD, HOUSING, MEDICAL CARE, AND HEATING?: PATIENT DECLINED

## 2024-05-07 SDOH — ECONOMIC STABILITY: FOOD INSECURITY: WITHIN THE PAST 12 MONTHS, THE FOOD YOU BOUGHT JUST DIDN'T LAST AND YOU DIDN'T HAVE MONEY TO GET MORE.: PATIENT DECLINED

## 2024-05-07 SDOH — ECONOMIC STABILITY: FOOD INSECURITY: WITHIN THE PAST 12 MONTHS, YOU WORRIED THAT YOUR FOOD WOULD RUN OUT BEFORE YOU GOT MONEY TO BUY MORE.: PATIENT DECLINED

## 2024-05-07 ASSESSMENT — PATIENT HEALTH QUESTIONNAIRE - PHQ9
1. LITTLE INTEREST OR PLEASURE IN DOING THINGS: NOT AT ALL
SUM OF ALL RESPONSES TO PHQ QUESTIONS 1-9: 0
SUM OF ALL RESPONSES TO PHQ QUESTIONS 1-9: 0
SUM OF ALL RESPONSES TO PHQ9 QUESTIONS 1 & 2: 0
SUM OF ALL RESPONSES TO PHQ QUESTIONS 1-9: 0
2. FEELING DOWN, DEPRESSED OR HOPELESS: NOT AT ALL
SUM OF ALL RESPONSES TO PHQ QUESTIONS 1-9: 0

## 2024-05-07 NOTE — PROGRESS NOTES
Germaine Santo is a 23 y.o. female returns for an annual exam     Chief Complaint   Patient presents with    Annual Exam       Patient's last menstrual period was 04/11/2024.  Her periods are light, moderate in flow and usually regular with a 26-32 day interval with 3-7 day duration.  She does not have dysmenorrhea.  Problems: no problems  Birth Control: condoms most of the time.  Last Pap: 4/11/2023 negative  She does not have a history of SONAL 2, 3 or cervical cancer.   With regard to the Gardisil vaccine, she has received all 3 injections        Examination chaperoned by Fany Graves MA.  
yearly wellness visits  [facility]

## 2024-05-09 LAB
C TRACH RRNA SPEC QL NAA+PROBE: NEGATIVE
N GONORRHOEA RRNA SPEC QL NAA+PROBE: NEGATIVE
T VAGINALIS RRNA SPEC QL NAA+PROBE: NEGATIVE

## 2024-07-19 ENCOUNTER — OFFICE VISIT (OUTPATIENT)
Age: 24
End: 2024-07-19
Payer: MEDICAID

## 2024-07-19 VITALS
WEIGHT: 198 LBS | SYSTOLIC BLOOD PRESSURE: 110 MMHG | BODY MASS INDEX: 30.01 KG/M2 | HEART RATE: 101 BPM | HEIGHT: 68 IN | OXYGEN SATURATION: 99 % | DIASTOLIC BLOOD PRESSURE: 76 MMHG | RESPIRATION RATE: 18 BRPM

## 2024-07-19 DIAGNOSIS — G89.29 CHRONIC PAIN OF BOTH KNEES: ICD-10-CM

## 2024-07-19 DIAGNOSIS — R73.03 PREDIABETES: ICD-10-CM

## 2024-07-19 DIAGNOSIS — E55.9 VITAMIN D DEFICIENCY: ICD-10-CM

## 2024-07-19 DIAGNOSIS — K29.50 CHRONIC GASTRITIS WITHOUT BLEEDING, UNSPECIFIED GASTRITIS TYPE: ICD-10-CM

## 2024-07-19 DIAGNOSIS — M25.562 CHRONIC PAIN OF BOTH KNEES: ICD-10-CM

## 2024-07-19 DIAGNOSIS — E55.9 VITAMIN D DEFICIENCY: Primary | ICD-10-CM

## 2024-07-19 DIAGNOSIS — E78.00 ELEVATED LDL CHOLESTEROL LEVEL: ICD-10-CM

## 2024-07-19 DIAGNOSIS — G44.209 TENSION HEADACHE: ICD-10-CM

## 2024-07-19 DIAGNOSIS — M25.561 CHRONIC PAIN OF BOTH KNEES: ICD-10-CM

## 2024-07-19 PROCEDURE — 99214 OFFICE O/P EST MOD 30 MIN: CPT | Performed by: STUDENT IN AN ORGANIZED HEALTH CARE EDUCATION/TRAINING PROGRAM

## 2024-07-19 RX ORDER — TOPIRAMATE 25 MG/1
25 TABLET ORAL 2 TIMES DAILY
Qty: 180 TABLET | Refills: 1 | Status: SHIPPED | OUTPATIENT
Start: 2024-07-19

## 2024-07-19 RX ORDER — CELECOXIB 100 MG/1
100 CAPSULE ORAL 2 TIMES DAILY
Qty: 60 CAPSULE | Refills: 1 | Status: SHIPPED | OUTPATIENT
Start: 2024-07-19

## 2024-07-19 NOTE — PROGRESS NOTES
Chief Complaint   Patient presents with    Blood Work     Patient is here today for labwork.        Patient gave verbal consent today for POLO.    Accompanied by: n/a    Home BP cuff present today:  no  Home medication list or bottles present today: no  Forms for completion: no    Chest pain/pressure/discomfort: no  Shortness of breath:  no  If new or worsening symptoms, consider EKG.    \"Have you been to the ER, urgent care clinic since your last visit?  Hospitalized since your last visit?\"    NO    “Have you seen or consulted any other health care providers outside of [facility] since your last visit?”    NO            Click Here for Release of Records Request  
        Donna Eng MD    The patient (or guardian, if applicable) and other individuals in attendance with the patient were advised that Artificial Intelligence will be utilized during this visit to record and process the conversation to generate a clinical note. The patient (or guardian, if applicable) and other individuals in attendance at the appointment consented to the use of AI, including the recording.

## 2024-07-19 NOTE — PATIENT INSTRUCTIONS
Topical over the counter medications for pain:  - lidocaine - numbing  - diclofenac/voltaren - anti-inflammatory  - menthol - cooling  - capsacin - hot component of peppers  - other: emu oil, two old goats, CBD  - ice and heat

## 2024-07-23 LAB
25(OH)D3 SERPL-MCNC: 11.9 NG/ML (ref 30–100)
ALBUMIN SERPL-MCNC: 3.8 G/DL (ref 3.5–5)
ALBUMIN/GLOB SERPL: 1 (ref 1.1–2.2)
ALP SERPL-CCNC: 78 U/L (ref 45–117)
ALT SERPL-CCNC: 18 U/L (ref 12–78)
ANION GAP SERPL CALC-SCNC: 6 MMOL/L (ref 5–15)
AST SERPL-CCNC: 12 U/L (ref 15–37)
BILIRUB SERPL-MCNC: 0.6 MG/DL (ref 0.2–1)
BUN SERPL-MCNC: 7 MG/DL (ref 6–20)
BUN/CREAT SERPL: 13 (ref 12–20)
CALCIUM SERPL-MCNC: 9.5 MG/DL (ref 8.5–10.1)
CHLORIDE SERPL-SCNC: 105 MMOL/L (ref 97–108)
CHOLEST SERPL-MCNC: 171 MG/DL
CO2 SERPL-SCNC: 28 MMOL/L (ref 21–32)
CREAT SERPL-MCNC: 0.56 MG/DL (ref 0.55–1.02)
ERYTHROCYTE [DISTWIDTH] IN BLOOD BY AUTOMATED COUNT: 13.8 % (ref 11.5–14.5)
EST. AVERAGE GLUCOSE BLD GHB EST-MCNC: 108 MG/DL
GLOBULIN SER CALC-MCNC: 3.9 G/DL (ref 2–4)
GLUCOSE SERPL-MCNC: 86 MG/DL (ref 65–100)
HBA1C MFR BLD: 5.4 % (ref 4–5.6)
HCT VFR BLD AUTO: 35.5 % (ref 35–47)
HDLC SERPL-MCNC: 58 MG/DL
HDLC SERPL: 2.9 (ref 0–5)
HGB BLD-MCNC: 11.5 G/DL (ref 11.5–16)
LDLC SERPL CALC-MCNC: 106.2 MG/DL (ref 0–100)
MCH RBC QN AUTO: 28.4 PG (ref 26–34)
MCHC RBC AUTO-ENTMCNC: 32.4 G/DL (ref 30–36.5)
MCV RBC AUTO: 87.7 FL (ref 80–99)
NRBC # BLD: 0 K/UL (ref 0–0.01)
NRBC BLD-RTO: 0 PER 100 WBC
PLATELET # BLD AUTO: 311 K/UL (ref 150–400)
PMV BLD AUTO: 12.1 FL (ref 8.9–12.9)
POTASSIUM SERPL-SCNC: 4.4 MMOL/L (ref 3.5–5.1)
PROT SERPL-MCNC: 7.7 G/DL (ref 6.4–8.2)
RBC # BLD AUTO: 4.05 M/UL (ref 3.8–5.2)
SODIUM SERPL-SCNC: 139 MMOL/L (ref 136–145)
TRIGL SERPL-MCNC: 34 MG/DL
TSH SERPL DL<=0.05 MIU/L-ACNC: 0.8 UIU/ML (ref 0.36–3.74)
VLDLC SERPL CALC-MCNC: 6.8 MG/DL
WBC # BLD AUTO: 5.6 K/UL (ref 3.6–11)

## 2024-07-25 ENCOUNTER — PATIENT MESSAGE (OUTPATIENT)
Age: 24
End: 2024-07-25

## 2024-07-25 DIAGNOSIS — E55.9 VITAMIN D DEFICIENCY: Primary | ICD-10-CM

## 2024-07-25 RX ORDER — ERGOCALCIFEROL 1.25 MG/1
50000 CAPSULE ORAL WEEKLY
Qty: 12 CAPSULE | Refills: 1 | Status: SHIPPED | OUTPATIENT
Start: 2024-07-25

## 2024-09-18 ENCOUNTER — OFFICE VISIT (OUTPATIENT)
Age: 24
End: 2024-09-18
Payer: MEDICAID

## 2024-09-18 VITALS
HEART RATE: 70 BPM | HEIGHT: 69 IN | BODY MASS INDEX: 28.68 KG/M2 | DIASTOLIC BLOOD PRESSURE: 75 MMHG | WEIGHT: 193.6 LBS | SYSTOLIC BLOOD PRESSURE: 114 MMHG

## 2024-09-18 DIAGNOSIS — N90.89 VULVAR LESION: Primary | ICD-10-CM

## 2024-09-18 DIAGNOSIS — N89.8 VAGINAL DISCHARGE: ICD-10-CM

## 2024-09-18 DIAGNOSIS — N89.8 VAGINAL ITCHING: ICD-10-CM

## 2024-09-18 PROCEDURE — 99213 OFFICE O/P EST LOW 20 MIN: CPT | Performed by: OBSTETRICS & GYNECOLOGY

## 2024-09-18 RX ORDER — FLUCONAZOLE 150 MG/1
150 TABLET ORAL ONCE
Qty: 1 TABLET | Refills: 0 | Status: SHIPPED | OUTPATIENT
Start: 2024-09-18 | End: 2024-09-18

## 2024-09-20 LAB — HSV2 IGG SER IA-ACNC: <0.91 INDEX (ref 0–0.9)

## 2024-09-21 LAB
A VAGINAE DNA VAG QL NAA+PROBE: NORMAL SCORE
BVAB2 DNA VAG QL NAA+PROBE: NORMAL SCORE
C ALBICANS DNA VAG QL NAA+PROBE: NEGATIVE
C GLABRATA DNA VAG QL NAA+PROBE: NEGATIVE
C TRACH DNA SPEC QL NAA+PROBE: NEGATIVE
HSV1 DNA SPEC QL NAA+PROBE: NEGATIVE
HSV2 DNA SPEC QL NAA+PROBE: NEGATIVE
MEGA1 DNA VAG QL NAA+PROBE: NORMAL SCORE
N GONORRHOEA DNA VAG QL NAA+PROBE: NEGATIVE
T VAGINALIS DNA VAG QL NAA+PROBE: NEGATIVE

## 2024-11-19 NOTE — PROGRESS NOTES
Germaine Snato is a 24 y.o. female presents for a problem visit.    Chief Complaint   Patient presents with    Other     Patient's last menstrual period was 11/20/2024 (exact date).  Birth Control: condoms sometimes.  Last Pap: normal obtained 1 year(s) ago 4/11/23    The patient is here for vulvar itching      1. Have you been to the ER, urgent care clinic, or hospitalized since your last visit? No    2. Have you seen or consulted any other health care providers outside of the [facility] since your last visit? No    Examination chaperoned by Krystle Lowry LPN.

## 2024-11-27 ENCOUNTER — OFFICE VISIT (OUTPATIENT)
Age: 24
End: 2024-11-27
Payer: MEDICAID

## 2024-11-27 VITALS
DIASTOLIC BLOOD PRESSURE: 60 MMHG | RESPIRATION RATE: 18 BRPM | SYSTOLIC BLOOD PRESSURE: 113 MMHG | BODY MASS INDEX: 28.03 KG/M2 | HEART RATE: 71 BPM | WEIGHT: 189.8 LBS

## 2024-11-27 DIAGNOSIS — N89.8 VAGINAL ITCHING: Primary | ICD-10-CM

## 2024-11-27 PROCEDURE — 99212 OFFICE O/P EST SF 10 MIN: CPT | Performed by: OBSTETRICS & GYNECOLOGY

## 2024-11-27 NOTE — PROGRESS NOTES
OB/GYN Problem VIsit    HPI  Germaine Santo is a No obstetric history on file.,  24 y.o. female who presents for a problem visit.  Patient reports that she thought she had a rash that developed right before her menses.  This has since resolved.  But she feels like the skin feels bumpy.  This is mostly in the groin area.  She has no new sexual partners and no concerns for STDs.  She is not currently having itching.    Patient's last menstrual period was 11/20/2024 (exact date).  Birth Control: condoms sometimes.  Last Pap: normal obtained 1 year(s) ago 4/11/23         Past Medical History:   Diagnosis Date    Anxiety     Depression     Emotional disturbance of adolescence 03/13/2014    Headache     Migraine     Routine Papanicolaou smear 04/11/2023    NILM    Seasonal allergies      Past Surgical History:   Procedure Laterality Date    HERNIA REPAIR  2003    UPPER GASTROINTESTINAL ENDOSCOPY  04/04/2023     Social History     Occupational History    Not on file   Tobacco Use    Smoking status: Never    Smokeless tobacco: Never   Vaping Use    Vaping status: Never Used   Substance and Sexual Activity    Alcohol use: Not Currently    Drug use: No    Sexual activity: Not Currently     Partners: Male     Birth control/protection: Condom     Family History   Problem Relation Age of Onset    Elevated Lipids Mother     Hypertension Mother     Asthma Mother     Depression Mother     High Blood Pressure Mother     Breast Cancer Mother     Diabetes Mother     Elevated Lipids Maternal Grandfather     Hypertension Maternal Grandfather     Elevated Lipids Father     Hypertension Father     High Blood Pressure Father     Breast Cancer Paternal Grandmother        No Known Allergies  Prior to Admission medications    Medication Sig Start Date End Date Taking? Authorizing Provider   vitamin D (ERGOCALCIFEROL) 1.25 MG (74432 UT) CAPS capsule Take 1 capsule by mouth once a week 7/25/24  Yes Donna Eng MD   topiramate (TOPAMAX)

## 2024-12-01 LAB
A VAGINAE DNA VAG QL NAA+PROBE: NORMAL SCORE
BVAB2 DNA VAG QL NAA+PROBE: NORMAL SCORE
C ALBICANS DNA VAG QL NAA+PROBE: NEGATIVE
C GLABRATA DNA VAG QL NAA+PROBE: NEGATIVE
MEGA1 DNA VAG QL NAA+PROBE: NORMAL SCORE
T VAGINALIS DNA VAG QL NAA+PROBE: NEGATIVE

## 2025-05-08 ENCOUNTER — OFFICE VISIT (OUTPATIENT)
Age: 25
End: 2025-05-08
Payer: MEDICAID

## 2025-05-08 VITALS
SYSTOLIC BLOOD PRESSURE: 107 MMHG | HEIGHT: 69 IN | WEIGHT: 193 LBS | HEART RATE: 74 BPM | DIASTOLIC BLOOD PRESSURE: 71 MMHG | OXYGEN SATURATION: 93 % | BODY MASS INDEX: 28.58 KG/M2

## 2025-05-08 DIAGNOSIS — Z01.419 ENCOUNTER FOR GYNECOLOGICAL EXAMINATION (GENERAL) (ROUTINE) WITHOUT ABNORMAL FINDINGS: Primary | ICD-10-CM

## 2025-05-08 DIAGNOSIS — N94.6 DYSMENORRHEA: ICD-10-CM

## 2025-05-08 DIAGNOSIS — Z11.3 SCREENING FOR STD (SEXUALLY TRANSMITTED DISEASE): ICD-10-CM

## 2025-05-08 PROCEDURE — 99395 PREV VISIT EST AGE 18-39: CPT | Performed by: OBSTETRICS & GYNECOLOGY

## 2025-05-08 PROCEDURE — 99459 PELVIC EXAMINATION: CPT | Performed by: OBSTETRICS & GYNECOLOGY

## 2025-05-08 SDOH — ECONOMIC STABILITY: FOOD INSECURITY: WITHIN THE PAST 12 MONTHS, THE FOOD YOU BOUGHT JUST DIDN'T LAST AND YOU DIDN'T HAVE MONEY TO GET MORE.: SOMETIMES TRUE

## 2025-05-08 SDOH — ECONOMIC STABILITY: FOOD INSECURITY: WITHIN THE PAST 12 MONTHS, YOU WORRIED THAT YOUR FOOD WOULD RUN OUT BEFORE YOU GOT MONEY TO BUY MORE.: NEVER TRUE

## 2025-05-08 ASSESSMENT — PATIENT HEALTH QUESTIONNAIRE - PHQ9
6. FEELING BAD ABOUT YOURSELF - OR THAT YOU ARE A FAILURE OR HAVE LET YOURSELF OR YOUR FAMILY DOWN: MORE THAN HALF THE DAYS
1. LITTLE INTEREST OR PLEASURE IN DOING THINGS: SEVERAL DAYS
SUM OF ALL RESPONSES TO PHQ QUESTIONS 1-9: 12
SUM OF ALL RESPONSES TO PHQ QUESTIONS 1-9: 12
10. IF YOU CHECKED OFF ANY PROBLEMS, HOW DIFFICULT HAVE THESE PROBLEMS MADE IT FOR YOU TO DO YOUR WORK, TAKE CARE OF THINGS AT HOME, OR GET ALONG WITH OTHER PEOPLE: VERY DIFFICULT
2. FEELING DOWN, DEPRESSED OR HOPELESS: MORE THAN HALF THE DAYS
9. THOUGHTS THAT YOU WOULD BE BETTER OFF DEAD, OR OF HURTING YOURSELF: NOT AT ALL
5. POOR APPETITE OR OVEREATING: SEVERAL DAYS
8. MOVING OR SPEAKING SO SLOWLY THAT OTHER PEOPLE COULD HAVE NOTICED. OR THE OPPOSITE, BEING SO FIGETY OR RESTLESS THAT YOU HAVE BEEN MOVING AROUND A LOT MORE THAN USUAL: NOT AT ALL
SUM OF ALL RESPONSES TO PHQ QUESTIONS 1-9: 12
7. TROUBLE CONCENTRATING ON THINGS, SUCH AS READING THE NEWSPAPER OR WATCHING TELEVISION: NOT AT ALL
SUM OF ALL RESPONSES TO PHQ QUESTIONS 1-9: 12
3. TROUBLE FALLING OR STAYING ASLEEP: NEARLY EVERY DAY
4. FEELING TIRED OR HAVING LITTLE ENERGY: NEARLY EVERY DAY

## 2025-05-08 NOTE — PROGRESS NOTES
Germaine Santo is a 24 y.o. female returns for an annual exam     Chief Complaint   Patient presents with    Annual Exam       Patient's last menstrual period was 05/03/2025 (exact date).  Her periods are moderate in flow and usually regular with a 26-32 day interval with 3-7 day duration.  She has dysmenorrhea.  Problems: wants to talk to dr about getting IUD   Birth Control: condoms  Last Pap: normal obtained 4/11/2023  She does not have a history of SONAL 2, 3 or cervical cancer.   With regard to the Gardisil vaccine, she has received all 3 injections      1. Have you been to the ER, urgent care clinic, or hospitalized since your last visit? No    2. Have you seen or consulted any other health care providers outside of the [facility] since your last visit? No    Examination chaperoned by Pavan Jim MA.  
Depression     Emotional disturbance of adolescence 03/13/2014    Headache     Migraine     Routine Papanicolaou smear 04/11/2023    NILM    Seasonal allergies      Past Surgical History:   Procedure Laterality Date    HERNIA REPAIR  2003    UPPER GASTROINTESTINAL ENDOSCOPY  04/04/2023       Current Outpatient Medications   Medication Sig Dispense Refill    vitamin D (ERGOCALCIFEROL) 1.25 MG (40134 UT) CAPS capsule Take 1 capsule by mouth once a week 12 capsule 1    topiramate (TOPAMAX) 25 MG tablet Take 1 tablet by mouth 2 times daily 180 tablet 1    diclofenac sodium (VOLTAREN) 1 % GEL Apply 4 g topically 4 times daily 150 g 2    Multiple Vitamin (MULTIVITAMIN ADULT PO) Take by mouth      melatonin 3 MG TABS tablet Take 1 tablet by mouth daily      celecoxib (CELEBREX) 100 MG capsule Take 1 capsule by mouth 2 times daily (Patient not taking: Reported on 5/8/2025) 60 capsule 1     No current facility-administered medications for this visit.     Allergies: Patient has no known allergies.     Tobacco History:  reports that she has never smoked. She has never used smokeless tobacco.  Alcohol Abuse:  reports current alcohol use of about 5.0 standard drinks of alcohol per week.  Drug Abuse:  reports current drug use. Drug: Marijuana ([place]).    Family Medical/Cancer History:   Family History   Problem Relation Age of Onset    Elevated Lipids Mother     Hypertension Mother     Asthma Mother     Depression Mother     High Blood Pressure Mother     Breast Cancer Mother     Diabetes Mother     Elevated Lipids Maternal Grandfather     Hypertension Maternal Grandfather     Elevated Lipids Father     Hypertension Father     High Blood Pressure Father     Breast Cancer Paternal Grandmother         Physical Exam    /71 (BP Site: Right Upper Arm, Patient Position: Sitting, BP Cuff Size: Large Adult)   Pulse 74   Ht 1.753 m (5' 9\")   Wt 87.5 kg (193 lb)   LMP 05/03/2025 (Exact Date)   SpO2 93%   BMI 28.50 kg/m²

## 2025-06-19 ENCOUNTER — PROCEDURE VISIT (OUTPATIENT)
Age: 25
End: 2025-06-19
Payer: MEDICAID

## 2025-06-19 VITALS
WEIGHT: 187.6 LBS | BODY MASS INDEX: 27.7 KG/M2 | DIASTOLIC BLOOD PRESSURE: 74 MMHG | SYSTOLIC BLOOD PRESSURE: 116 MMHG | HEART RATE: 78 BPM

## 2025-06-19 DIAGNOSIS — Z30.430 ENCOUNTER FOR INSERTION OF KYLEENA IUD: Primary | ICD-10-CM

## 2025-06-19 DIAGNOSIS — N94.6 DYSMENORRHEA: ICD-10-CM

## 2025-06-19 LAB
HCG, PREGNANCY, URINE, POC: NEGATIVE
VALID INTERNAL CONTROL, POC: NEGATIVE

## 2025-06-19 PROCEDURE — 58300 INSERT INTRAUTERINE DEVICE: CPT | Performed by: OBSTETRICS & GYNECOLOGY

## 2025-06-19 PROCEDURE — 96372 THER/PROPH/DIAG INJ SC/IM: CPT | Performed by: OBSTETRICS & GYNECOLOGY

## 2025-06-19 PROCEDURE — 81025 URINE PREGNANCY TEST: CPT | Performed by: OBSTETRICS & GYNECOLOGY

## 2025-06-19 RX ADMIN — Medication 5 ML: at 16:09

## 2025-06-19 NOTE — PROGRESS NOTES
Germaine Santo is a 24 y.o. female presents for a problem visit.    No chief complaint on file.      Problems: IUD Insertion       No LMP recorded.    Birth Control: IUD.    Last Pap: normal obtained 4/11/2023        1. Have you been to the ER, urgent care clinic, or hospitalized since your last visit? No    2. Have you seen or consulted any other health care providers outside of the [facility] since your last visit? No    Device number [id number], expiration date 03/31/2027    Chart reviewed for the following:   Pavan BRUMFIELD MA, have reviewed the History, Physical and updated the Allergic reactions for Germaine Santo     TIME OUT performed immediately prior to start of procedure:   Pavan BRUMFIELD MA, have performed the following reviews on Germaine Santo prior to the start of the procedure:            * Patient was identified by name and date of birth   * Agreement on procedure being performed was verified  * Risks and Benefits explained to the patient  * Procedure site verified and marked as necessary  * Patient was positioned for comfort  * Consent was signed and verified     Time: 2:30pm    Date of procedure: 6/19/2025    Procedure performed by:  Christelle Mondragon MD       Provider assisted by: Pavan Jim MA    Patient assisted by: self    How tolerated by patient: tolerated the procedure well with no complications    Post Procedural Pain Scale: 0 - No Hurt    Comments: none    Examination chaperoned by Pavan Jim MA.

## 2025-06-19 NOTE — PROGRESS NOTES
KYLEENA IUD INSERTION  Indications:  Germaine Santo is a No obstetric history on file.,  24 y.o. female Black /  Patient's last menstrual period was 06/14/2025 (exact date).  Her LMP was normal in duration and amount of flow. She  presents for insertion of an IUD.    The risks, benefits and alternatives of IUD insertion were discussed in detail at last visit.  She also has reviewed Kyleena information. She has elected to proceed with the insertion today and she states she has no further questions. A urine pregnancy test was negative  Procedure:  The pelvic exam revealed normal external genitalia. On bimanual exam the uterus was anteverted and normal in size with no tenderness present. A speculum was inserted into the vagina and the cervix was visualized. The cervix was prepped with zephiran solution.  A paracervical block was placed.  The anterior lip of the cervix was grasped with a single toothed tenaculum. The uterus was sounded with a Quinonez sound to 8 centimeters. A Kyleena IUD was then inserted without difficulty. The string was cut to 3 centimeters.She experienced a mild  amount of cramping.   Post Procedure Status:   She tolerated the procedure with mild discomfort. The patient was observed for 5 minutes after the insertion. There were no complications.   Patient was discharged in stable condition.  Patient advised risk of expulsion and bleeding  Follow-up in 4 weeks

## 2025-07-07 DIAGNOSIS — Z30.431 IUD CHECK UP: Primary | ICD-10-CM

## 2025-07-18 ENCOUNTER — OFFICE VISIT (OUTPATIENT)
Age: 25
End: 2025-07-18

## 2025-07-18 VITALS
HEIGHT: 69 IN | DIASTOLIC BLOOD PRESSURE: 72 MMHG | HEART RATE: 75 BPM | BODY MASS INDEX: 27.7 KG/M2 | SYSTOLIC BLOOD PRESSURE: 111 MMHG | WEIGHT: 187 LBS

## 2025-07-18 DIAGNOSIS — N94.6 DYSMENORRHEA: ICD-10-CM

## 2025-07-18 DIAGNOSIS — Z30.431 IUD CHECK UP: Primary | ICD-10-CM

## 2025-07-18 NOTE — PROGRESS NOTES
Germaine Santo is a 24 y.o. female presents for a problem visit.    Chief Complaint   Patient presents with    IUD Check     Patient's last menstrual period was 06/14/2025 (exact date).  Birth Control: IUD.  Last Pap: normal obtained 4/11/2023    The patient presents for an IUD Check        1. Have you been to the ER, urgent care clinic, or hospitalized since your last visit? No    2. Have you seen or consulted any other health care providers outside of the [facility] since your last visit? No    Examination chaperoned by Pavan Jim MA.  
tablet Take 1 tablet by mouth 2 times daily 7/19/24  Yes Donna Eng MD   diclofenac sodium (VOLTAREN) 1 % GEL Apply 4 g topically 4 times daily 7/19/24  Yes Donna Eng MD   Multiple Vitamin (MULTIVITAMIN ADULT PO) Take by mouth   Yes Terrence Gold MD   melatonin 3 MG TABS tablet Take 1 tablet by mouth daily   Yes Terrence Gold MD   celecoxib (CELEBREX) 100 MG capsule Take 1 capsule by mouth 2 times daily  Patient not taking: Reported on 5/8/2025 7/19/24   Donna Eng MD        Review of Systems: History obtained from the patient  Constitutional: negative for weight loss, fever, night sweats  Breast: negative for breast lumps, nipple discharge, galactorrhea  GI: negative for change in bowel habits, abdominal pain, black or bloody stools  : negative for frequency, dysuria, hematuria, vaginal discharge  MSK: negative for back pain, joint pain, muscle pain  Skin: negative for itching, rash, hives  Psych: negative for anxiety, depression, change in mood      Objective:  /72 (BP Site: Right Upper Arm, Patient Position: Sitting, BP Cuff Size: Large Adult)   Pulse 75   Ht 1.753 m (5' 9\")   Wt 84.8 kg (187 lb)   LMP 06/14/2025 (Exact Date)   BMI 27.62 kg/m²     Physical Exam:   PHYSICAL EXAMINATION    Constitutional  Appearance: well-nourished, well developed, alert, in no acute distress      Gastrointestinal  Abdominal Examination: abdomen non-tender to palpation, normal bowel sounds, no masses present  Liver and spleen: no hepatomegaly present, spleen not palpable  Hernias: no hernias identified    Genitourinary  External Genitalia: normal appearance for age, no discharge present, no tenderness present, no inflammatory lesions present, no masses present, no atrophy present  Vagina: normal vaginal vault without central or paravaginal defects, no discharge present, no inflammatory lesions present, no masses present  Bladder: non-tender to palpation  Urethra: appears normal  Cervix: